# Patient Record
Sex: FEMALE | Race: WHITE | NOT HISPANIC OR LATINO | URBAN - METROPOLITAN AREA
[De-identification: names, ages, dates, MRNs, and addresses within clinical notes are randomized per-mention and may not be internally consistent; named-entity substitution may affect disease eponyms.]

---

## 2018-01-29 LAB — INR PPP: 3.1

## 2018-02-12 LAB — INR PPP: 2.2

## 2018-03-01 LAB — INR PPP: 1.9

## 2018-03-05 ENCOUNTER — ANTICOAGULATION VISIT (OUTPATIENT)
Dept: SCHEDULING | Facility: CLINIC | Age: 73
End: 2018-03-05

## 2018-03-09 LAB — INR PPP: 3.1 (ref 2–3)

## 2018-03-12 ENCOUNTER — TELEPHONE (OUTPATIENT)
Dept: CARDIOLOGY | Facility: CLINIC | Age: 73
End: 2018-03-12

## 2018-03-12 NOTE — TELEPHONE ENCOUNTER
Pt of Dr. Santiago managed by Hillary Lacey at [facility]. Call from RICKY Hernandez. Noted she sent INR to [facility] last Thursday, 3/8, but pt did not receive call with results. I advised Mary that the results to not appear to have pulled into Epic. Advised Mary that I would notify Hillary that INR was sent. Mary also sent another INR today. Noted she would like pt set up for home draws starting this Thursday 3/15. Mary can be reached at [phone] with any questions. Thank you.

## 2018-03-13 ENCOUNTER — ANTICOAGULATION VISIT (OUTPATIENT)
Dept: CARDIOLOGY | Facility: CLINIC | Age: 73
End: 2018-03-13

## 2018-03-13 LAB — INR PPP: 3.6

## 2018-03-13 RX ORDER — WARFARIN SODIUM 5 MG/1
5 TABLET ORAL DAILY
Qty: 90 TABLET | Refills: 3 | Status: SHIPPED | OUTPATIENT
Start: 2018-03-13 | End: 2019-04-05 | Stop reason: SDUPTHER

## 2018-03-13 NOTE — PROGRESS NOTES
Called [facility] after finding out that Kita had lab work done last week.  Called to Mary the visiting nurse, apparently an INR was also drawn yesterday.  I called [facility] but this was not available yet.  Will call Kita with yesterday's report.  Also set up home lab draws since the visiting nurse will be ending her sessions.

## 2018-03-14 ENCOUNTER — ANTICOAGULATION VISIT (OUTPATIENT)
Dept: CARDIOLOGY | Facility: CLINIC | Age: 73
End: 2018-03-14

## 2018-03-14 NOTE — PROGRESS NOTES
I spoke to Kita - on pain medications after shoulder replacement.  She will hold dose of coumadin today andtake 2.5 mg tomorrow.  She will have home lab draw tomorrow and I will call her on Friday.

## 2018-03-15 LAB — INR PPP: 2.7

## 2018-03-16 ENCOUNTER — TELEPHONE (OUTPATIENT)
Dept: SCHEDULING | Facility: CLINIC | Age: 73
End: 2018-03-16

## 2018-03-16 ENCOUNTER — ANTICOAGULATION VISIT (OUTPATIENT)
Dept: CARDIOLOGY | Facility: CLINIC | Age: 73
End: 2018-03-16

## 2018-03-16 NOTE — TELEPHONE ENCOUNTER
Spoke to Hillary and was told to take 1/2 pill last night and she forgot. Please call pat to advise todays dose.

## 2018-03-19 NOTE — TELEPHONE ENCOUNTER
Pts home care nurse is calling as an FYI to set the pt up to have the pts lab drawn in her home on Mondays and Thursdays.

## 2018-03-19 NOTE — TELEPHONE ENCOUNTER
Kita is set up for home draws - they will be out on Thursday.   I don't think twice a week INR's are necessary at this point.

## 2018-03-20 ENCOUNTER — ANTICOAGULATION VISIT (OUTPATIENT)
Dept: CARDIOLOGY | Facility: CLINIC | Age: 73
End: 2018-03-20

## 2018-03-20 LAB — INR PPP: 1.8 (ref 2–3)

## 2018-03-20 NOTE — PROGRESS NOTES
I spoke to Kita - she will continue current dose of coumadin and will have level rechecked later this week

## 2018-03-24 LAB
INR PPP: 2.2 (ref 0.8–1.2)
INR PPP: 2.2 (ref 2–3)
PROTHROMBIN TIME: 21.4 SEC (ref 9.1–12)

## 2018-03-26 ENCOUNTER — ANTICOAGULATION VISIT (OUTPATIENT)
Dept: CARDIOLOGY | Facility: CLINIC | Age: 73
End: 2018-03-26

## 2018-04-12 ENCOUNTER — ANTICOAGULATION VISIT (OUTPATIENT)
Dept: CARDIOLOGY | Facility: CLINIC | Age: 73
End: 2018-04-12

## 2018-04-12 LAB
INR PPP: 2.9 (ref 0.8–1.2)
PROTHROMBIN TIME: 28 SEC (ref 9.1–12)

## 2018-04-26 ENCOUNTER — TELEPHONE (OUTPATIENT)
Dept: SCHEDULING | Facility: CLINIC | Age: 73
End: 2018-04-26

## 2018-04-26 LAB
INR PPP: 2.8 (ref 0.8–1.2)
INR PPP: 2.8 (ref 2–3)
PROTHROMBIN TIME: 27.1 SEC (ref 9.1–12)

## 2018-04-26 NOTE — TELEPHONE ENCOUNTER
Dr Santiago pat. She is seeing podiatrist next 5/2 for ingrown nail. She is asking for coumadin instructions prior to procedure.

## 2018-04-27 ENCOUNTER — ANTICOAGULATION VISIT (OUTPATIENT)
Dept: CARDIOLOGY | Facility: CLINIC | Age: 73
End: 2018-04-27

## 2018-04-27 ENCOUNTER — TELEPHONE (OUTPATIENT)
Dept: SCHEDULING | Facility: CLINIC | Age: 73
End: 2018-04-27

## 2018-04-27 NOTE — TELEPHONE ENCOUNTER
Jack pt - Emmanuel pt - pt calling to ask if okay to HOLD coumadin for ingrown toenail procedure on Wed 5/2  Instructed pt to check w/Isabel SERRANO to see how long they are recommending to HOLD. Pt also calling to speak w/Hillary regarding her INR result from Wed 4/25.

## 2018-04-27 NOTE — TELEPHONE ENCOUNTER
Pt Dr Santiago  Pt called to let Coumadin Nurses know she is going to podiatrist on Wednesday and he said she can continue her coumadin as normal.  Thank you

## 2018-04-27 NOTE — PROGRESS NOTES
I spoke to Kita - she will continue current dose of coumadin.   She spoke to her podiatrist who told her that she did not need to hold her coumadin prior to treatment of ingrown toenail.

## 2018-05-10 LAB
INR PPP: 3 (ref 0.8–1.2)
Lab: NORMAL
PROTHROMBIN TIME: 28.8 SEC (ref 9.1–12)

## 2018-05-14 ENCOUNTER — ANTICOAGULATION VISIT (OUTPATIENT)
Dept: CARDIOLOGY | Facility: CLINIC | Age: 73
End: 2018-05-14

## 2018-05-23 LAB — INR PPP: 2.3 (ref 2–3)

## 2018-05-24 ENCOUNTER — ANTICOAGULATION VISIT (OUTPATIENT)
Dept: CARDIOLOGY | Facility: CLINIC | Age: 73
End: 2018-05-24

## 2018-05-24 LAB
INR PPP: 2.3 (ref 0.8–1.2)
PROTHROMBIN TIME: 22.3 SEC (ref 9.1–12)

## 2018-06-07 ENCOUNTER — ANTICOAGULATION VISIT (OUTPATIENT)
Dept: CARDIOLOGY | Facility: CLINIC | Age: 73
End: 2018-06-07

## 2018-06-07 ENCOUNTER — TELEPHONE (OUTPATIENT)
Dept: CARDIOLOGY | Facility: CLINIC | Age: 73
End: 2018-06-07

## 2018-06-07 LAB
INR PPP: 2.4 (ref 0.8–1.2)
PROTHROMBIN TIME: 23.9 SEC (ref 9.1–12)

## 2018-06-14 ENCOUNTER — OFFICE VISIT (OUTPATIENT)
Dept: CARDIOLOGY | Facility: CLINIC | Age: 73
End: 2018-06-14
Attending: INTERNAL MEDICINE
Payer: MEDICARE

## 2018-06-14 VITALS
HEART RATE: 68 BPM | SYSTOLIC BLOOD PRESSURE: 120 MMHG | DIASTOLIC BLOOD PRESSURE: 70 MMHG | RESPIRATION RATE: 12 BRPM | TEMPERATURE: 98.4 F

## 2018-06-14 DIAGNOSIS — R94.31 ABNORMAL EKG: Primary | ICD-10-CM

## 2018-06-14 DIAGNOSIS — I48.20 CHRONIC ATRIAL FIBRILLATION (CMS/HCC): ICD-10-CM

## 2018-06-14 PROBLEM — I89.0 LYMPHEDEMA: Status: ACTIVE | Noted: 2018-06-14

## 2018-06-14 PROBLEM — M17.0 OSTEOARTHRITIS OF BOTH KNEES: Status: ACTIVE | Noted: 2018-06-14

## 2018-06-14 PROBLEM — E66.3 OVERWEIGHT: Status: ACTIVE | Noted: 2018-06-14

## 2018-06-14 PROCEDURE — 93000 ELECTROCARDIOGRAM COMPLETE: CPT | Performed by: INTERNAL MEDICINE

## 2018-06-14 PROCEDURE — 99214 OFFICE O/P EST MOD 30 MIN: CPT | Performed by: INTERNAL MEDICINE

## 2018-06-14 RX ORDER — MULTIVITAMIN
1 TABLET ORAL DAILY
COMMUNITY
Start: 2016-09-07

## 2018-06-14 RX ORDER — HYDROCHLOROTHIAZIDE 25 MG/1
25 TABLET ORAL DAILY
COMMUNITY
Start: 2016-09-07

## 2018-06-14 RX ORDER — BIOTIN 1000 MCG
1 TABLET,CHEWABLE ORAL DAILY
COMMUNITY
Start: 2018-02-08

## 2018-06-14 RX ORDER — LANOLIN ALCOHOL/MO/W.PET/CERES
CREAM (GRAM) TOPICAL
COMMUNITY

## 2018-06-14 ASSESSMENT — ENCOUNTER SYMPTOMS
EYES NEGATIVE: 1
RESPIRATORY NEGATIVE: 1
NERVOUS/ANXIOUS: 1
NEUROLOGICAL NEGATIVE: 1
HEMATOLOGIC/LYMPHATIC NEGATIVE: 1
CHILLS: 1
DEPRESSION: 1
WEIGHT GAIN: 1
GASTROINTESTINAL NEGATIVE: 1

## 2018-06-14 NOTE — PROGRESS NOTES
Chief Complaint: I saw Kita in the office today on a routine visit for her chronic atrial fibrillation and postop after her shoulder replacement.  She is hemodynamically stable does not ambulate so it is difficult to assess her shortness of breath.  She denies chest discomfort or shortness of breath with anxiety, cold weather, postprandially, at rest, or nocturnally.  She denies exertional dyspnea because she does not walk very far, she denies proximal nocturnal dyspnea, orthopnea, palpitations, syncope, she has lower extremity edema secondary to chronic lymphedema and she has nocturia.       Medications:  Current Outpatient Prescriptions on File Prior to Visit   Medication Sig Dispense Refill   • warfarin (COUMADIN) 5 mg tablet Take 1 tablet (5 mg total) by mouth once daily. Take as directed 90 tablet 3     No current facility-administered medications on file prior to visit.        Review of Systems:  Review of Systems   Constitution: Positive for chills and weight gain.   HENT: Negative.    Eyes: Negative.    Cardiovascular: Positive for leg swelling.   Respiratory: Negative.    Endocrine: Positive for cold intolerance.   Hematologic/Lymphatic: Negative.    Skin: Negative.    Musculoskeletal: Positive for arthritis.   Gastrointestinal: Negative.    Genitourinary: Positive for nocturia.   Neurological: Negative.    Psychiatric/Behavioral: Positive for depression. The patient is nervous/anxious.    Allergic/Immunologic: Positive for environmental allergies.       Physical Exam:  Vitals:    06/14/18 1122   BP: 120/70   Pulse: 68   Resp: 12   Temp: 36.9 °C (98.4 °F)     Physical Exam   Constitutional: She is oriented to person, place, and time. She appears well-developed and well-nourished.   HENT:   Head: Normocephalic and atraumatic.   Eyes: Conjunctivae and EOM are normal. Pupils are equal, round, and reactive to light.   Neck: Normal range of motion. Neck supple.   Cardiovascular: Normal heart sounds and intact  distal pulses.    irregular   Pulmonary/Chest: Effort normal and breath sounds normal.   Abdominal: Soft. Bowel sounds are normal.   Musculoskeletal: Normal range of motion. She exhibits edema.   Neurological: She is alert and oriented to person, place, and time. She has normal reflexes.   Skin: Skin is warm and dry.   Psychiatric: She has a normal mood and affect. Her behavior is normal. Judgment and thought content normal.     EKG: Afib. Controlled vent. Response.  PRWP V1-4, ST-Tabn    Assessment and Plan:  Impression:  Atrial fibrillation.  Lymphedema.  Osteoarthritis.  Obesity.  Recommendation:  She is hemodynamically stable we did not have to make any changes in her medications today.  We will see her again in 6 months time.  She is going to try to do physical therapy to begin to try to ambulate again.  She has not walked since her back surgery and hip replacement.  If there is a problem we will see her sooner than 6 months.    Shahid Santiago, DO

## 2018-06-14 NOTE — LETTER
June 14, 2018     Jerome Daniel ., DO  [address]  [place] [address]    Patient: Kita Vo   YOB: 1945   Date of Visit: 6/14/2018       Dear Dr. Daniel:    Thank you for referring Kita Vo to me for evaluation. Below are my notes for this consultation.    If you have questions, please do not hesitate to call me. I look forward to following your patient along with you.         Sincerely,        Shahid Santiago DO        CC: No Recipients  Shahid Santiago DO  6/14/2018 12:07 PM  Signed    Chief Complaint: I saw Kita in the office today on a routine visit for her chronic atrial fibrillation and postop after her shoulder replacement.  She is hemodynamically stable does not ambulate so it is difficult to assess her shortness of breath.  She denies chest discomfort or shortness of breath with anxiety, cold weather, postprandially, at rest, or nocturnally.  She denies exertional dyspnea because she does not walk very far, she denies proximal nocturnal dyspnea, orthopnea, palpitations, syncope, she has lower extremity edema secondary to chronic lymphedema and she has nocturia.       Medications:  Current Outpatient Prescriptions on File Prior to Visit   Medication Sig Dispense Refill   • warfarin (COUMADIN) 5 mg tablet Take 1 tablet (5 mg total) by mouth once daily. Take as directed 90 tablet 3     No current facility-administered medications on file prior to visit.        Review of Systems:  Review of Systems   Constitution: Positive for chills and weight gain.   HENT: Negative.    Eyes: Negative.    Cardiovascular: Positive for leg swelling.   Respiratory: Negative.    Endocrine: Positive for cold intolerance.   Hematologic/Lymphatic: Negative.    Skin: Negative.    Musculoskeletal: Positive for arthritis.   Gastrointestinal: Negative.    Genitourinary: Positive for nocturia.   Neurological: Negative.    Psychiatric/Behavioral: Positive for depression. The patient is nervous/anxious.     Allergic/Immunologic: Positive for environmental allergies.       Physical Exam:  Vitals:    06/14/18 1122   BP: 120/70   Pulse: 68   Resp: 12   Temp: 36.9 °C (98.4 °F)     Physical Exam   Constitutional: She is oriented to person, place, and time. She appears well-developed and well-nourished.   HENT:   Head: Normocephalic and atraumatic.   Eyes: Conjunctivae and EOM are normal. Pupils are equal, round, and reactive to light.   Neck: Normal range of motion. Neck supple.   Cardiovascular: Normal heart sounds and intact distal pulses.    irregular   Pulmonary/Chest: Effort normal and breath sounds normal.   Abdominal: Soft. Bowel sounds are normal.   Musculoskeletal: Normal range of motion. She exhibits edema.   Neurological: She is alert and oriented to person, place, and time. She has normal reflexes.   Skin: Skin is warm and dry.   Psychiatric: She has a normal mood and affect. Her behavior is normal. Judgment and thought content normal.     EKG: Afib. Controlled vent. Response.  PRWP V1-4, ST-Tabn    Assessment and Plan:  Impression:  Atrial fibrillation.  Lymphedema.  Osteoarthritis.  Obesity.  Recommendation:  She is hemodynamically stable we did not have to make any changes in her medications today.  We will see her again in 6 months time.  She is going to try to do physical therapy to begin to try to ambulate again.  She has not walked since her back surgery and hip replacement.  If there is a problem we will see her sooner than 6 months.    Shahid Santiago, DO

## 2018-06-20 LAB
INR PPP: 2.8
INR PPP: 2.8

## 2018-06-21 LAB
INR PPP: 2.8 (ref 0.8–1.2)
PROTHROMBIN TIME: 26.8 SEC (ref 9.1–12)

## 2018-06-22 ENCOUNTER — ANTICOAGULATION VISIT (OUTPATIENT)
Dept: CARDIOLOGY | Facility: CLINIC | Age: 73
End: 2018-06-22

## 2018-06-22 NOTE — PATIENT INSTRUCTIONS
I called and left a voicemail for patient letting her know her INR from 06/20/18 was 2.8 which is within range and to continue current Coumadin regimen. I let her know I placed a PT/INR order for 07/03/18 before the holiday. I told her to call the office with any questions.

## 2018-07-05 ENCOUNTER — TELEPHONE (OUTPATIENT)
Dept: CARDIOLOGY | Facility: CLINIC | Age: 73
End: 2018-07-05

## 2018-07-05 NOTE — TELEPHONE ENCOUNTER
Coumadin pt of Hillary Lacey.  Pt was sched for home draw on 7/4..  Of course due to holiday this was not done.  She would like a call back.

## 2018-07-05 NOTE — TELEPHONE ENCOUNTER
Returned a call to Kita - she has a doctors appt. Early tomorrow morning so I changed her lab draw to next Wednesday.  I also changed the frequency to q 3 weeks.

## 2018-07-12 ENCOUNTER — ANTICOAGULATION VISIT (OUTPATIENT)
Dept: CARDIOLOGY | Facility: CLINIC | Age: 73
End: 2018-07-12

## 2018-07-12 LAB
INR PPP: 1.6 (ref 0.8–1.2)
PROTHROMBIN TIME: 16.5 SEC (ref 9.1–12)

## 2018-07-12 NOTE — PROGRESS NOTES
Kita believes that she missed a dose of coumadin last week.  She will continue regular schedule and we will recheck in 2 weeks.  She is generally therapeutic.

## 2018-08-23 ENCOUNTER — ANTICOAGULATION VISIT (OUTPATIENT)
Dept: CARDIOLOGY | Facility: CLINIC | Age: 73
End: 2018-08-23

## 2018-08-23 LAB
INR PPP: 2.7
INR PPP: 2.7 (ref 0.8–1.2)
PROTHROMBIN TIME: 26.8 SEC (ref 9.1–12)

## 2018-09-13 ENCOUNTER — ANTICOAGULATION VISIT (OUTPATIENT)
Dept: CARDIOLOGY | Facility: CLINIC | Age: 73
End: 2018-09-13

## 2018-09-13 LAB
INR PPP: 2.2 (ref 0.8–1.2)
PROTHROMBIN TIME: 22.5 SEC (ref 9.1–12)

## 2018-10-04 ENCOUNTER — ANTICOAGULATION VISIT (OUTPATIENT)
Dept: CARDIOLOGY | Facility: CLINIC | Age: 73
End: 2018-10-04

## 2018-10-04 LAB
INR PPP: 2.4 (ref 0.8–1.2)
PROTHROMBIN TIME: 24.4 SEC (ref 9.1–12)

## 2018-10-25 ENCOUNTER — ANTICOAGULATION VISIT (OUTPATIENT)
Dept: CARDIOLOGY | Facility: CLINIC | Age: 73
End: 2018-10-25

## 2018-10-25 LAB
INR PPP: 2.2 (ref 0.8–1.2)
PROTHROMBIN TIME: 22 SEC (ref 9.1–12)

## 2018-10-25 NOTE — PROGRESS NOTES
I spoke to Kita - she will continue current dose of coumadin.  She is requesting monthly lab draws ( instead of q3wks).  She has been pretty consistently therapeutic, will change order.

## 2018-11-22 LAB
INR PPP: 2.4 (ref 0.8–1.2)
PROTHROMBIN TIME: 23.6 SEC (ref 9.1–12)

## 2018-11-26 ENCOUNTER — ANTICOAGULATION VISIT (OUTPATIENT)
Dept: CARDIOLOGY | Facility: CLINIC | Age: 73
End: 2018-11-26

## 2018-12-13 ENCOUNTER — OFFICE VISIT (OUTPATIENT)
Dept: CARDIOLOGY | Facility: CLINIC | Age: 73
End: 2018-12-13
Payer: MEDICARE

## 2018-12-13 VITALS
TEMPERATURE: 98.6 F | RESPIRATION RATE: 14 BRPM | DIASTOLIC BLOOD PRESSURE: 86 MMHG | HEART RATE: 80 BPM | SYSTOLIC BLOOD PRESSURE: 134 MMHG

## 2018-12-13 DIAGNOSIS — I48.20 CHRONIC ATRIAL FIBRILLATION (CMS/HCC): Primary | ICD-10-CM

## 2018-12-13 PROBLEM — M19.019 SHOULDER ARTHRITIS: Status: ACTIVE | Noted: 2018-02-14

## 2018-12-13 PROCEDURE — 99214 OFFICE O/P EST MOD 30 MIN: CPT | Performed by: INTERNAL MEDICINE

## 2018-12-13 PROCEDURE — 93000 ELECTROCARDIOGRAM COMPLETE: CPT | Performed by: INTERNAL MEDICINE

## 2018-12-13 RX ORDER — GUAIFENESIN 600 MG/1
600 TABLET, EXTENDED RELEASE ORAL 2 TIMES DAILY
COMMUNITY
End: 2022-02-24 | Stop reason: ALTCHOICE

## 2018-12-13 RX ORDER — FLUTICASONE PROPIONATE 50 MCG
2 SPRAY, SUSPENSION (ML) NASAL DAILY PRN
COMMUNITY
Start: 2018-12-12 | End: 2021-02-25

## 2018-12-13 ASSESSMENT — ENCOUNTER SYMPTOMS
HEMATOLOGIC/LYMPHATIC NEGATIVE: 1
EYES NEGATIVE: 1
GASTROINTESTINAL NEGATIVE: 1
RESPIRATORY NEGATIVE: 1
PSYCHIATRIC NEGATIVE: 1
PARESTHESIAS: 1
ENDOCRINE NEGATIVE: 1
CONSTITUTIONAL NEGATIVE: 1

## 2018-12-13 NOTE — LETTER
December 13, 2018     Jerome Daniel Sr., DO  [address]  [place] [address]    Patient: Kita Vo   YOB: 1945   Date of Visit: 12/13/2018       Dear Dr. Daniel:    Thank you for referring Kita Vo to me for evaluation. Below are my notes for this consultation.    If you have questions, please do not hesitate to call me. I look forward to following your patient along with you.         Sincerely,        Shahid Santiago DO        CC: No Recipients  Shahid Santiago DO  12/13/2018 10:44 AM  Signed      Chief Complaint: I saw Ms. Sheikh in the office today on a routine visit for her anticoagulation of her chronic atrial fibrillation.  She does not walk to is hard to assess her true functional capacity.  She denies chest discomfort or shortness of breath with anxiety cold weather postprandially at rest or nocturnally.  She does not climb stairs.  She denies proximal nocturnal dyspnea orthopnea palpitations syncope she has bilateral lymphedema and she has nocturia.       Medications:  Current Outpatient Prescriptions   Medication Sig Dispense Refill   • calcium carbonate-vitamin D3 (CALCIUM WITH VITAMIN D) 600 mg(1,500mg) -400 unit per tablet Take 1 tablet by mouth daily.     • cyanocobalamin (vitamin B-12) 1,000 mcg tablet take 1 tablet by oral route  every day     • fluticasone (FLONASE) 50 mcg/actuation nasal spray Administer 2 sprays into each nostril daily as needed.     • guaiFENesin (MUCINEX) 600 mg 12 hr tablet Take 600 mg by mouth 2 (two) times a day.     • hydrochlorothiazide (HYDRODIURIL) 25 mg tablet Take 25 mg by mouth daily.     • multivit-iron-min-folic acid (multivitamin-iron-minerals-folic acid) 3,500-18-0.4 unit-mg-mg tablet,chewable Take 1 tablet by mouth daily.     • warfarin (COUMADIN) 5 mg tablet Take 1 tablet (5 mg total) by mouth once daily. Take as directed 90 tablet 3   • biotin 1,000 mcg tablet,chewable Take 1 tablet by mouth daily.       No current facility-administered  medications for this visit.        Review of Systems:  Review of Systems   Constitution: Negative.   HENT: Positive for congestion.    Eyes: Negative.    Cardiovascular: Positive for leg swelling.        Lymphedema   Respiratory: Negative.    Endocrine: Negative.    Hematologic/Lymphatic: Negative.    Skin: Negative.    Musculoskeletal: Positive for joint pain.   Gastrointestinal: Negative.    Genitourinary: Positive for nocturia.   Neurological: Positive for paresthesias.   Psychiatric/Behavioral: Negative.    Allergic/Immunologic: Positive for environmental allergies.       Physical Exam:  Vitals:    12/13/18 1035   BP: 134/86   Pulse: 80   Resp: 14   Temp: 37 °C (98.6 °F)     Physical Exam   Constitutional: She is oriented to person, place, and time. She appears well-developed and well-nourished.   obese   HENT:   Head: Normocephalic and atraumatic.   Eyes: Conjunctivae and EOM are normal. Pupils are equal, round, and reactive to light.   Neck: Normal range of motion. Neck supple.   Cardiovascular: Normal rate, regular rhythm, normal heart sounds and intact distal pulses.    Pulmonary/Chest: Effort normal and breath sounds normal.   Abdominal: Soft. Bowel sounds are normal.   Musculoskeletal: Normal range of motion. She exhibits edema.   Neurological: She is alert and oriented to person, place, and time. She has normal strength and normal reflexes.   Skin: Skin is warm, dry and intact.   Psychiatric: She has a normal mood and affect. Her speech is normal and behavior is normal. Judgment and thought content normal. Cognition and memory are normal.     EKG: AFIB controlled ventricular response Leftward axis ST-Tabn    Assessment and Plan:  Impression:  A. fib controlled ventricular response on warfarin.  Lymphedema mild systolic hypertension.  Obesity.  Recommendation:  She is to continue on her anticoagulation and her self-directed care of her lymphedema.  We will see her in 6 months time, if there is a problem we  will see her sooner.  We did discuss diet and exercise with her it is fruitless at this point.  Thank you very much for allowing us see this very interesting woman.    Shahid Santiago, DO

## 2018-12-13 NOTE — PROGRESS NOTES
Chief Complaint: I saw Ms. Sheikh in the office today on a routine visit for her anticoagulation of her chronic atrial fibrillation.  She does not walk to is hard to assess her true functional capacity.  She denies chest discomfort or shortness of breath with anxiety cold weather postprandially at rest or nocturnally.  She does not climb stairs.  She denies proximal nocturnal dyspnea orthopnea palpitations syncope she has bilateral lymphedema and she has nocturia.       Medications:  Current Outpatient Prescriptions   Medication Sig Dispense Refill   • calcium carbonate-vitamin D3 (CALCIUM WITH VITAMIN D) 600 mg(1,500mg) -400 unit per tablet Take 1 tablet by mouth daily.     • cyanocobalamin (vitamin B-12) 1,000 mcg tablet take 1 tablet by oral route  every day     • fluticasone (FLONASE) 50 mcg/actuation nasal spray Administer 2 sprays into each nostril daily as needed.     • guaiFENesin (MUCINEX) 600 mg 12 hr tablet Take 600 mg by mouth 2 (two) times a day.     • hydrochlorothiazide (HYDRODIURIL) 25 mg tablet Take 25 mg by mouth daily.     • multivit-iron-min-folic acid (multivitamin-iron-minerals-folic acid) 3,500-18-0.4 unit-mg-mg tablet,chewable Take 1 tablet by mouth daily.     • warfarin (COUMADIN) 5 mg tablet Take 1 tablet (5 mg total) by mouth once daily. Take as directed 90 tablet 3   • biotin 1,000 mcg tablet,chewable Take 1 tablet by mouth daily.       No current facility-administered medications for this visit.        Review of Systems:  Review of Systems   Constitution: Negative.   HENT: Positive for congestion.    Eyes: Negative.    Cardiovascular: Positive for leg swelling.        Lymphedema   Respiratory: Negative.    Endocrine: Negative.    Hematologic/Lymphatic: Negative.    Skin: Negative.    Musculoskeletal: Positive for joint pain.   Gastrointestinal: Negative.    Genitourinary: Positive for nocturia.   Neurological: Positive for paresthesias.   Psychiatric/Behavioral: Negative.     Allergic/Immunologic: Positive for environmental allergies.       Physical Exam:  Vitals:    12/13/18 1035   BP: 134/86   Pulse: 80   Resp: 14   Temp: 37 °C (98.6 °F)     Physical Exam   Constitutional: She is oriented to person, place, and time. She appears well-developed and well-nourished.   obese   HENT:   Head: Normocephalic and atraumatic.   Eyes: Conjunctivae and EOM are normal. Pupils are equal, round, and reactive to light.   Neck: Normal range of motion. Neck supple.   Cardiovascular: Normal rate, regular rhythm, normal heart sounds and intact distal pulses.    Pulmonary/Chest: Effort normal and breath sounds normal.   Abdominal: Soft. Bowel sounds are normal.   Musculoskeletal: Normal range of motion. She exhibits edema.   Neurological: She is alert and oriented to person, place, and time. She has normal strength and normal reflexes.   Skin: Skin is warm, dry and intact.   Psychiatric: She has a normal mood and affect. Her speech is normal and behavior is normal. Judgment and thought content normal. Cognition and memory are normal.     EKG: AFIB controlled ventricular response Leftward axis ST-Tabn    Assessment and Plan:  Impression:  A. fib controlled ventricular response on warfarin.  Lymphedema mild systolic hypertension.  Obesity.  Recommendation:  She is to continue on her anticoagulation and her self-directed care of her lymphedema.  We will see her in 6 months time, if there is a problem we will see her sooner.  We did discuss diet and exercise with her it is fruitless at this point.  Thank you very much for allowing us see this very interesting woman.    Shahid Santiago,

## 2018-12-20 ENCOUNTER — ANTICOAGULATION VISIT (OUTPATIENT)
Dept: CARDIOLOGY | Facility: CLINIC | Age: 73
End: 2018-12-20

## 2018-12-20 LAB
INR PPP: 2 (ref 0.8–1.2)
PROTHROMBIN TIME: 19.8 SEC (ref 9.1–12)

## 2019-01-17 ENCOUNTER — ANTICOAGULATION VISIT (OUTPATIENT)
Dept: CARDIOLOGY | Facility: CLINIC | Age: 74
End: 2019-01-17

## 2019-01-17 LAB
INR PPP: 2.1 (ref 0.8–1.2)
PROTHROMBIN TIME: 21.4 SEC (ref 9.1–12)

## 2019-02-14 ENCOUNTER — ANTICOAGULATION VISIT (OUTPATIENT)
Dept: CARDIOLOGY | Facility: CLINIC | Age: 74
End: 2019-02-14

## 2019-02-14 LAB
INR PPP: 2.8 (ref 0.8–1.2)
PROTHROMBIN TIME: 28 SEC (ref 9.1–12)

## 2019-02-14 NOTE — PROGRESS NOTES
I called Mrs. Rubio and GIULIA that her INR was within range and to continue her current dose of coumadin.

## 2019-03-13 LAB — INR PPP: 2 (ref 2–3)

## 2019-03-14 ENCOUNTER — ANTICOAGULATION VISIT (OUTPATIENT)
Dept: CARDIOLOGY | Facility: CLINIC | Age: 74
End: 2019-03-14

## 2019-03-14 LAB
INR PPP: 2 (ref 0.8–1.2)
PROTHROMBIN TIME: 20.4 SEC (ref 9.1–12)

## 2019-04-05 RX ORDER — WARFARIN SODIUM 5 MG/1
5 TABLET ORAL DAILY
Qty: 90 TABLET | Refills: 3 | Status: SHIPPED | OUTPATIENT
Start: 2019-04-05 | End: 2020-04-29 | Stop reason: SDUPTHER

## 2019-04-11 ENCOUNTER — ANTICOAGULATION VISIT (OUTPATIENT)
Dept: CARDIOLOGY | Facility: CLINIC | Age: 74
End: 2019-04-11

## 2019-04-11 LAB
INR PPP: 1.9 (ref 0.8–1.2)
PROTHROMBIN TIME: 19.3 SEC (ref 9.1–12)

## 2019-05-08 LAB — INR PPP: 2.1 (ref 2–3)

## 2019-05-09 ENCOUNTER — ANTICOAGULATION VISIT (OUTPATIENT)
Dept: CARDIOLOGY | Facility: CLINIC | Age: 74
End: 2019-05-09

## 2019-05-09 LAB
INR PPP: 2.1 (ref 0.8–1.2)
PROTHROMBIN TIME: 20.9 SEC (ref 9.1–12)

## 2019-05-23 ENCOUNTER — TELEPHONE (OUTPATIENT)
Dept: SCHEDULING | Facility: CLINIC | Age: 74
End: 2019-05-23

## 2019-05-23 NOTE — TELEPHONE ENCOUNTER
I returned a call to Kita - she hasn't even scheduled an appointment with the dentist yet so not sure what they need to do.  I told her that she does not need to hold for most things.  If having an extraction, she can hold for 2 days.  She has an appt. In 2 weeks to see Dr. Santiago.

## 2019-05-23 NOTE — TELEPHONE ENCOUNTER
Pt is unscheduled for a dental procedure and would like to know if and or can she HOLD Warfarin and if so, how many days prior to the dental procedure.      Please contact pt at

## 2019-05-23 NOTE — TELEPHONE ENCOUNTER
Chart reviewed. Patient follows with Dr. Santiago in the [facility] office with Coumadin management there. She is on Coumadin for a history of persistent atrial fibrillation. Routing to you to address, thank you.

## 2019-06-06 ENCOUNTER — ANTICOAGULATION VISIT (OUTPATIENT)
Dept: CARDIOLOGY | Facility: CLINIC | Age: 74
End: 2019-06-06

## 2019-06-06 LAB
INR PPP: 1.8 (ref 0.8–1.2)
PROTHROMBIN TIME: 17.9 SEC (ref 9.1–12)

## 2019-06-06 NOTE — PROGRESS NOTES
I called Kita and GIULIA for her to continue her current dose of coumadin - generally therapeutic.

## 2019-06-13 ENCOUNTER — OFFICE VISIT (OUTPATIENT)
Dept: CARDIOLOGY | Facility: CLINIC | Age: 74
End: 2019-06-13
Payer: MEDICARE

## 2019-06-13 VITALS — HEART RATE: 71 BPM | RESPIRATION RATE: 16 BRPM | DIASTOLIC BLOOD PRESSURE: 70 MMHG | SYSTOLIC BLOOD PRESSURE: 110 MMHG

## 2019-06-13 DIAGNOSIS — I48.20 CHRONIC ATRIAL FIBRILLATION (CMS/HCC): Primary | ICD-10-CM

## 2019-06-13 PROBLEM — K46.9 ENTEROCELE: Status: ACTIVE | Noted: 2019-06-13

## 2019-06-13 PROCEDURE — 99214 OFFICE O/P EST MOD 30 MIN: CPT | Performed by: INTERNAL MEDICINE

## 2019-06-13 PROCEDURE — 93000 ELECTROCARDIOGRAM COMPLETE: CPT | Performed by: INTERNAL MEDICINE

## 2019-06-13 ASSESSMENT — ENCOUNTER SYMPTOMS
PSYCHIATRIC NEGATIVE: 1
BRUISES/BLEEDS EASILY: 1
EYES NEGATIVE: 1
CONSTITUTIONAL NEGATIVE: 1
PARESTHESIAS: 1
GASTROINTESTINAL NEGATIVE: 1
RESPIRATORY NEGATIVE: 1
ENDOCRINE NEGATIVE: 1

## 2019-06-13 NOTE — PROGRESS NOTES
Chief Complaint: I saw Ms. Sheikh in the office today on a routine visit for her atrial fibrillation and hypertension.  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  She denies any form of chest discomfort or shortness of breath with anxiety, cold weather, postprandially, at rest, or nocturnally.  She does not climb stairs, she denies proximal nocturnal dyspnea, orthopnea, palpitations, syncope she has lymphedema both legs does not want to be a today there is no cellulitis and she has nocturia.       Medications:  Current Outpatient Prescriptions   Medication Sig Dispense Refill   • biotin 1,000 mcg tablet,chewable Take 1 tablet by mouth daily.     • calcium carbonate-vitamin D3 (CALCIUM WITH VITAMIN D) 600 mg(1,500mg) -400 unit per tablet Take 1 tablet by mouth daily.     • cyanocobalamin (vitamin B-12) 1,000 mcg tablet take 1 tablet by oral route  every day     • fluticasone (FLONASE) 50 mcg/actuation nasal spray Administer 2 sprays into each nostril daily as needed.     • hydrochlorothiazide (HYDRODIURIL) 25 mg tablet Take 25 mg by mouth daily.     • multivit-iron-min-folic acid (multivitamin-iron-minerals-folic acid) 3,500-18-0.4 unit-mg-mg tablet,chewable Take 1 tablet by mouth daily.     • warfarin (COUMADIN) 5 mg tablet Take 1 tablet (5 mg total) by mouth once daily. Take as directed 90 tablet 3   • guaiFENesin (MUCINEX) 600 mg 12 hr tablet Take 600 mg by mouth 2 (two) times a day.       No current facility-administered medications for this visit.        Review of Systems:  Review of Systems   Constitution: Negative.   HENT: Negative.    Eyes: Negative.    Cardiovascular: Positive for leg swelling.   Respiratory: Negative.    Endocrine: Negative.    Hematologic/Lymphatic: Bruises/bleeds easily.   Skin: Negative.    Musculoskeletal: Positive for arthritis.   Gastrointestinal: Negative.    Genitourinary: Positive for nocturia.    Neurological: Positive for paresthesias.   Psychiatric/Behavioral: Negative.    Allergic/Immunologic: Positive for environmental allergies.       Physical Exam:  Vitals:    06/13/19 1015   BP: 110/70   Pulse: 71   Resp: 16     Physical Exam   Constitutional: She is oriented to person, place, and time.   obesity   HENT:   Head: Normocephalic and atraumatic.   Eyes: Pupils are equal, round, and reactive to light. Conjunctivae and EOM are normal.   Neck: Normal range of motion. Neck supple.   Cardiovascular:   afib controlled ventricular   Pulmonary/Chest: Effort normal and breath sounds normal.   Abdominal: Soft. Bowel sounds are normal.   Musculoskeletal: She exhibits edema.   Neurological: She is alert and oriented to person, place, and time. She has normal reflexes.   Skin: Skin is warm and dry.   Psychiatric: She has a normal mood and affect. Her behavior is normal. Judgment and thought content normal.     EKG: afib controlled ventricluar response, ST-Tabn    Assessment and Plan:  Impression:  Atrial fibrillation controlled ventricular response.  Lymphedema.  Abnormal EKG.  Overweight.  Recommendation:  She is hemodynamically stable I did not have to make any changes in her medications today we did  her on diet and salt restriction and fluid restriction and support stockings.  We will see her again in 6 months time, if there is a problem we will see her sooner.  Thank you for the opportunity seeing Mrs. Isbell in the office today.    Shahid Santiago,

## 2019-06-13 NOTE — LETTER
June 13, 2019     Jerome Daniel Sr., DO  [address]  [place] [address]    Patient: Kita Vo  YOB: 1945  Date of Visit: 6/13/2019      Dear Dr. Daniel:    Thank you for referring Kita Vo to me for evaluation. Below are my notes for this consultation.    If you have questions, please do not hesitate to call me. I look forward to following your patient along with you.         Sincerely,        Shahid Santiago DO        CC: No Recipients  Shahid Santiago DO  6/13/2019 10:44 AM  Signed      Chief Complaint: I saw Ms. Sheikh in the office today on a routine visit for her atrial fibrillation and hypertension.  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  She denies any form of chest discomfort or shortness of breath with anxiety, cold weather, postprandially, at rest, or nocturnally.  She does not climb stairs, she denies proximal nocturnal dyspnea, orthopnea, palpitations, syncope she has lymphedema both legs does not want to be a today there is no cellulitis and she has nocturia.       Medications:  Current Outpatient Prescriptions   Medication Sig Dispense Refill   • biotin 1,000 mcg tablet,chewable Take 1 tablet by mouth daily.     • calcium carbonate-vitamin D3 (CALCIUM WITH VITAMIN D) 600 mg(1,500mg) -400 unit per tablet Take 1 tablet by mouth daily.     • cyanocobalamin (vitamin B-12) 1,000 mcg tablet take 1 tablet by oral route  every day     • fluticasone (FLONASE) 50 mcg/actuation nasal spray Administer 2 sprays into each nostril daily as needed.     • hydrochlorothiazide (HYDRODIURIL) 25 mg tablet Take 25 mg by mouth daily.     • multivit-iron-min-folic acid (multivitamin-iron-minerals-folic acid) 3,500-18-0.4 unit-mg-mg tablet,chewable Take 1 tablet by mouth daily.     • warfarin (COUMADIN) 5 mg tablet Take 1 tablet (5 mg total) by mouth once daily. Take as directed 90 tablet 3   • guaiFENesin (MUCINEX)  600 mg 12 hr tablet Take 600 mg by mouth 2 (two) times a day.       No current facility-administered medications for this visit.        Review of Systems:  Review of Systems   Constitution: Negative.   HENT: Negative.    Eyes: Negative.    Cardiovascular: Positive for leg swelling.   Respiratory: Negative.    Endocrine: Negative.    Hematologic/Lymphatic: Bruises/bleeds easily.   Skin: Negative.    Musculoskeletal: Positive for arthritis.   Gastrointestinal: Negative.    Genitourinary: Positive for nocturia.   Neurological: Positive for paresthesias.   Psychiatric/Behavioral: Negative.    Allergic/Immunologic: Positive for environmental allergies.       Physical Exam:  Vitals:    06/13/19 1015   BP: 110/70   Pulse: 71   Resp: 16     Physical Exam   Constitutional: She is oriented to person, place, and time.   obesity   HENT:   Head: Normocephalic and atraumatic.   Eyes: Pupils are equal, round, and reactive to light. Conjunctivae and EOM are normal.   Neck: Normal range of motion. Neck supple.   Cardiovascular:   afib controlled ventricular   Pulmonary/Chest: Effort normal and breath sounds normal.   Abdominal: Soft. Bowel sounds are normal.   Musculoskeletal: She exhibits edema.   Neurological: She is alert and oriented to person, place, and time. She has normal reflexes.   Skin: Skin is warm and dry.   Psychiatric: She has a normal mood and affect. Her behavior is normal. Judgment and thought content normal.     EKG: afib controlled ventricluar response, ST-Tabn    Assessment and Plan:  Impression:  Atrial fibrillation controlled ventricular response.  Lymphedema.  Abnormal EKG.  Overweight.  Recommendation:  She is hemodynamically stable I did not have to make any changes in her medications today we did  her on diet and salt restriction and fluid restriction and support stockings.  We will see her again in 6 months time, if there is a problem we will see her sooner.  Thank you for the opportunity seeing  Mrs. Isbell in the office today.    Shahid Santiago, DO

## 2019-07-04 LAB
INR PPP: 1.9 (ref 0.8–1.2)
PROTHROMBIN TIME: 18.8 SEC (ref 9.1–12)

## 2019-07-05 ENCOUNTER — ANTICOAGULATION VISIT (OUTPATIENT)
Dept: CARDIOLOGY | Facility: CLINIC | Age: 74
End: 2019-07-05

## 2019-08-01 ENCOUNTER — ANTICOAGULATION VISIT (OUTPATIENT)
Dept: CARDIOLOGY | Facility: CLINIC | Age: 74
End: 2019-08-01

## 2019-08-01 LAB
INR PPP: 2.4 (ref 0.8–1.2)
PROTHROMBIN TIME: 23.6 SEC (ref 9.1–12)

## 2019-08-29 ENCOUNTER — ANTICOAGULATION VISIT (OUTPATIENT)
Dept: CARDIOLOGY | Facility: CLINIC | Age: 74
End: 2019-08-29

## 2019-08-29 LAB
INR PPP: 2.3 (ref 0.8–1.2)
PROTHROMBIN TIME: 22.3 SEC (ref 9.1–12)

## 2019-09-26 ENCOUNTER — ANTICOAGULATION VISIT (OUTPATIENT)
Dept: CARDIOLOGY | Facility: CLINIC | Age: 74
End: 2019-09-26

## 2019-09-26 LAB
INR PPP: 2.2 (ref 0.8–1.2)
PROTHROMBIN TIME: 21.6 SEC (ref 9.1–12)

## 2019-09-26 NOTE — PROGRESS NOTES
I called and LM for Kita that her INR was within range and to continue her current dose of coumadin.  Asked her to call me back with any questions or change in medications.

## 2019-10-24 ENCOUNTER — ANTICOAGULATION VISIT (OUTPATIENT)
Dept: CARDIOLOGY | Facility: CLINIC | Age: 74
End: 2019-10-24

## 2019-10-24 LAB
INR PPP: 2.2
INR PPP: 2.2 (ref 0.8–1.2)
PROTHROMBIN TIME: 21.4 SEC (ref 9.1–12)

## 2019-11-18 ENCOUNTER — TELEPHONE (OUTPATIENT)
Dept: SCHEDULING | Facility: CLINIC | Age: 74
End: 2019-11-18

## 2019-11-18 NOTE — TELEPHONE ENCOUNTER
Dr. Santiago patient.    Patient calling to let us know she needs a new 6 month INR lab slip to continue with her home INR lab draws through [facility]. Patient told me that Christina ( [facility] representative) faxed a request to you on Friday. If you need to speak with Christina directly she can be reached at [phone].    Once above has been taken care of, patient would like a return call at [phone] to let her know a new lab slip has been generated. Thank you.

## 2019-11-18 NOTE — TELEPHONE ENCOUNTER
I contacted [facility] to confirm that they rec'd the order that I faxed last week.  They have it and Kita is scheduled for this Thursday.  I called her back to let her know and told her that I would call her on Friday with her results.

## 2019-11-28 LAB
INR PPP: 2.5
INR PPP: 2.5 (ref 0.8–1.2)
PROTHROMBIN TIME: 23.6 SEC (ref 9.1–12)

## 2019-11-29 ENCOUNTER — ANTICOAGULATION VISIT (OUTPATIENT)
Dept: CARDIOLOGY | Facility: CLINIC | Age: 74
End: 2019-11-29

## 2019-12-12 ENCOUNTER — OFFICE VISIT (OUTPATIENT)
Dept: CARDIOLOGY | Facility: CLINIC | Age: 74
End: 2019-12-12
Payer: MEDICARE

## 2019-12-12 VITALS
SYSTOLIC BLOOD PRESSURE: 124 MMHG | BODY MASS INDEX: 40.92 KG/M2 | TEMPERATURE: 98.6 F | HEART RATE: 71 BPM | HEIGHT: 68 IN | RESPIRATION RATE: 16 BRPM | WEIGHT: 270 LBS | DIASTOLIC BLOOD PRESSURE: 76 MMHG

## 2019-12-12 DIAGNOSIS — I48.91 ATRIAL FIBRILLATION, UNSPECIFIED TYPE (CMS/HCC): Primary | ICD-10-CM

## 2019-12-12 PROCEDURE — 99214 OFFICE O/P EST MOD 30 MIN: CPT | Performed by: INTERNAL MEDICINE

## 2019-12-12 PROCEDURE — 93000 ELECTROCARDIOGRAM COMPLETE: CPT | Performed by: INTERNAL MEDICINE

## 2019-12-12 ASSESSMENT — ENCOUNTER SYMPTOMS
DEPRESSION: 1
CONSTITUTIONAL NEGATIVE: 1
EYES NEGATIVE: 1
HEMATOLOGIC/LYMPHATIC NEGATIVE: 1
ENDOCRINE NEGATIVE: 1
NEUROLOGICAL NEGATIVE: 1
RESPIRATORY NEGATIVE: 1
IRREGULAR HEARTBEAT: 1

## 2019-12-12 NOTE — LETTER
December 12, 2019     Jerome Daniel Sr., DO  [address]  [place] [address]    Patient: Kita Vo  YOB: 1945  Date of Visit: 12/12/2019      Dear Dr. Daniel:    Thank you for referring Kita Vo to me for evaluation. Below are my notes for this consultation.    If you have questions, please do not hesitate to call me. I look forward to following your patient along with you.         Sincerely,        Shahid Santiago DO        CC: No Recipients  Shahid Santiago DO  12/12/2019 11:50 AM  Signed      Chief Complaint: I saw Kita in the office today on a routine visit for her hypertension, atrial fibrillation, and lymphedema of her lower extremities.  She is hemodynamically stable does not ambulate due to her severe arthritis, denies chest discomfort or shortness of breath with anxiety, cold weather, postprandially, at rest or nocturnally.  She sleeps upright due to musculoskeletal dysfunction denies proximal nocturnal dyspnea orthopnea palpitations syncope and has chronic lymphedema and nocturia.       Medications:  Current Outpatient Medications   Medication Sig Dispense Refill   • biotin 1,000 mcg tablet,chewable Take 1 tablet by mouth daily.     • calcium carbonate-vitamin D3 (CALCIUM WITH VITAMIN D) 600 mg(1,500mg) -400 unit per tablet Take 1 tablet by mouth daily.     • cyanocobalamin (vitamin B-12) 1,000 mcg tablet take 1 tablet by oral route  every day     • hydrochlorothiazide (HYDRODIURIL) 25 mg tablet Take 25 mg by mouth daily.     • multivit-iron-min-folic acid (multivitamin-iron-minerals-folic acid) 3,500-18-0.4 unit-mg-mg tablet,chewable Take 1 tablet by mouth daily.     • warfarin (COUMADIN) 5 mg tablet Take 1 tablet (5 mg total) by mouth once daily. Take as directed 90 tablet 3   • fluticasone (FLONASE) 50 mcg/actuation nasal spray Administer 2 sprays into each nostril daily as needed.     • guaiFENesin (MUCINEX) 600 mg 12 hr tablet Take 600 mg by mouth 2 (two) times a day.        No current facility-administered medications for this visit.        Review of Systems:  Review of Systems   Constitution: Negative.   HENT: Negative.    Eyes: Negative.    Cardiovascular: Positive for irregular heartbeat and leg swelling.   Respiratory: Negative.    Endocrine: Negative.    Hematologic/Lymphatic: Negative.    Skin: Negative.    Musculoskeletal: Positive for joint pain.   Genitourinary: Positive for nocturia.   Neurological: Negative.    Psychiatric/Behavioral: Positive for depression.   Allergic/Immunologic: Positive for environmental allergies.       Physical Exam:  Vitals:    12/12/19 1118   BP: 124/76   Pulse: 71   Resp: 16   Temp: 37 °C (98.6 °F)     Physical Exam   Constitutional: She is oriented to person, place, and time.   overweight   HENT:   Head: Normocephalic and atraumatic.   Eyes: Pupils are equal, round, and reactive to light. Conjunctivae and EOM are normal.   Neck: Normal range of motion. Neck supple.   Cardiovascular:   afib   Pulmonary/Chest: Effort normal and breath sounds normal.   Abdominal: Soft. Bowel sounds are normal.   Musculoskeletal: She exhibits edema.   Neurological: She is alert and oriented to person, place, and time. She has normal reflexes.   Skin: Skin is warm and dry.   Psychiatric: She has a normal mood and affect. Her behavior is normal. Judgment and thought content normal.     EKG: Afib. Controlled ventricular response ST-T abn  Assessment and Plan:  Atrial fibrillation controlled ventricular spots.  Hypertension controlled.  Lymphedema.  Osteoarthritis.  Spinal stenosis.  Recommendation she is hemodynamically stable I did not make any changes in her medications today.  Jerome I feel it safe that we can decrease her office visits to once a year.  Her Coumadin levels have been managed by you, her blood pressure is controlled.  Her lymphedema is positional most of the time.  If there is a problem we will see her sooner than 1 years thank you very much for  allowing us to see this nice woman and her .  Shahid Santiago, DO

## 2019-12-12 NOTE — PROGRESS NOTES
Chief Complaint: I saw Kita in the office today on a routine visit for her hypertension, atrial fibrillation, and lymphedema of her lower extremities.  She is hemodynamically stable does not ambulate due to her severe arthritis, denies chest discomfort or shortness of breath with anxiety, cold weather, postprandially, at rest or nocturnally.  She sleeps upright due to musculoskeletal dysfunction denies proximal nocturnal dyspnea orthopnea palpitations syncope and has chronic lymphedema and nocturia.       Medications:  Current Outpatient Medications   Medication Sig Dispense Refill   • biotin 1,000 mcg tablet,chewable Take 1 tablet by mouth daily.     • calcium carbonate-vitamin D3 (CALCIUM WITH VITAMIN D) 600 mg(1,500mg) -400 unit per tablet Take 1 tablet by mouth daily.     • cyanocobalamin (vitamin B-12) 1,000 mcg tablet take 1 tablet by oral route  every day     • hydrochlorothiazide (HYDRODIURIL) 25 mg tablet Take 25 mg by mouth daily.     • multivit-iron-min-folic acid (multivitamin-iron-minerals-folic acid) 3,500-18-0.4 unit-mg-mg tablet,chewable Take 1 tablet by mouth daily.     • warfarin (COUMADIN) 5 mg tablet Take 1 tablet (5 mg total) by mouth once daily. Take as directed 90 tablet 3   • fluticasone (FLONASE) 50 mcg/actuation nasal spray Administer 2 sprays into each nostril daily as needed.     • guaiFENesin (MUCINEX) 600 mg 12 hr tablet Take 600 mg by mouth 2 (two) times a day.       No current facility-administered medications for this visit.        Review of Systems:  Review of Systems   Constitution: Negative.   HENT: Negative.    Eyes: Negative.    Cardiovascular: Positive for irregular heartbeat and leg swelling.   Respiratory: Negative.    Endocrine: Negative.    Hematologic/Lymphatic: Negative.    Skin: Negative.    Musculoskeletal: Positive for joint pain.   Genitourinary: Positive for nocturia.   Neurological: Negative.    Psychiatric/Behavioral: Positive for depression.    Allergic/Immunologic: Positive for environmental allergies.       Physical Exam:  Vitals:    12/12/19 1118   BP: 124/76   Pulse: 71   Resp: 16   Temp: 37 °C (98.6 °F)     Physical Exam   Constitutional: She is oriented to person, place, and time.   overweight   HENT:   Head: Normocephalic and atraumatic.   Eyes: Pupils are equal, round, and reactive to light. Conjunctivae and EOM are normal.   Neck: Normal range of motion. Neck supple.   Cardiovascular:   afib   Pulmonary/Chest: Effort normal and breath sounds normal.   Abdominal: Soft. Bowel sounds are normal.   Musculoskeletal: She exhibits edema.   Neurological: She is alert and oriented to person, place, and time. She has normal reflexes.   Skin: Skin is warm and dry.   Psychiatric: She has a normal mood and affect. Her behavior is normal. Judgment and thought content normal.     EKG: Afib. Controlled ventricular response ST-T abn  Assessment and Plan:  Atrial fibrillation controlled ventricular spots.  Hypertension controlled.  Lymphedema.  Osteoarthritis.  Spinal stenosis.  Recommendation she is hemodynamically stable I did not make any changes in her medications today.  Jerome I feel it safe that we can decrease her office visits to once a year.  Her Coumadin levels have been managed by you, her blood pressure is controlled.  Her lymphedema is positional most of the time.  If there is a problem we will see her sooner than 1 years thank you very much for allowing us to see this nice woman and her .  Shahid Santiago,

## 2019-12-19 ENCOUNTER — ANTICOAGULATION VISIT (OUTPATIENT)
Dept: CARDIOLOGY | Facility: CLINIC | Age: 74
End: 2019-12-19

## 2019-12-19 LAB
INR PPP: 2.7 (ref 0.8–1.2)
PROTHROMBIN TIME: 25.5 SEC (ref 9.1–12)

## 2020-01-09 ENCOUNTER — TELEPHONE (OUTPATIENT)
Dept: SCHEDULING | Facility: CLINIC | Age: 75
End: 2020-01-09

## 2020-01-09 NOTE — TELEPHONE ENCOUNTER
I returned a call to Kita.  She wanted to give me her new insurance information so I could relay to [facility] for her home lab draws.  I sent them a fax - [place] [employer]   [id number]

## 2020-01-16 ENCOUNTER — ANTICOAGULATION VISIT (OUTPATIENT)
Dept: CARDIOLOGY | Facility: CLINIC | Age: 75
End: 2020-01-16

## 2020-01-16 LAB
INR PPP: 2.6 (ref 0.8–1.2)
PROTHROMBIN TIME: 24.9 SEC (ref 9.1–12)

## 2020-02-13 ENCOUNTER — ANTICOAGULATION VISIT (OUTPATIENT)
Dept: CARDIOLOGY | Facility: CLINIC | Age: 75
End: 2020-02-13

## 2020-02-13 LAB
INR PPP: 2.6 (ref 0.8–1.2)
PROTHROMBIN TIME: 24.5 SEC (ref 9.1–12)

## 2020-03-19 ENCOUNTER — ANTICOAGULATION VISIT (OUTPATIENT)
Dept: CARDIOLOGY | Facility: CLINIC | Age: 75
End: 2020-03-19

## 2020-03-19 LAB
INR PPP: 2.1
INR PPP: 2.1 (ref 0.8–1.2)
PROTHROMBIN TIME: 20.2 SEC (ref 9.1–12)

## 2020-04-16 ENCOUNTER — ANTICOAGULATION VISIT (OUTPATIENT)
Dept: CARDIOLOGY | Facility: CLINIC | Age: 75
End: 2020-04-16

## 2020-04-16 LAB
INR PPP: 2.8 (ref 0.8–1.2)
PROTHROMBIN TIME: 26.1 SEC (ref 9.1–12)

## 2020-04-29 RX ORDER — WARFARIN SODIUM 5 MG/1
5 TABLET ORAL DAILY
Qty: 90 TABLET | Refills: 3 | Status: SHIPPED | OUTPATIENT
Start: 2020-04-29 | End: 2021-05-03

## 2020-05-13 LAB — INR PPP: 2.6

## 2020-05-14 ENCOUNTER — ANTICOAGULATION VISIT (OUTPATIENT)
Dept: CARDIOLOGY | Facility: CLINIC | Age: 75
End: 2020-05-14

## 2020-05-14 LAB
INR PPP: 2.6 (ref 0.8–1.2)
PROTHROMBIN TIME: 24.7 SEC (ref 9.1–12)

## 2020-06-11 ENCOUNTER — ANTICOAGULATION VISIT (OUTPATIENT)
Dept: CARDIOLOGY | Facility: CLINIC | Age: 75
End: 2020-06-11

## 2020-06-11 LAB
INR PPP: 2.5
INR PPP: 2.5 (ref 0.8–1.2)
PROTHROMBIN TIME: 23.6 SEC (ref 9.1–12)

## 2020-06-11 NOTE — PROGRESS NOTES
I called Kita - went to .  I left a message that her INR was therapeutic and to continue the same dose of coumadin.  Asked her to call with any questions.

## 2020-08-06 ENCOUNTER — ANTICOAGULATION VISIT (OUTPATIENT)
Dept: CARDIOLOGY | Facility: CLINIC | Age: 75
End: 2020-08-06

## 2020-08-06 LAB
INR PPP: 2.8 (ref 0.8–1.2)
PROTHROMBIN TIME: 27.2 SEC (ref 9.1–12)

## 2020-09-03 ENCOUNTER — ANTICOAGULATION VISIT (OUTPATIENT)
Dept: CARDIOLOGY | Facility: CLINIC | Age: 75
End: 2020-09-03

## 2020-09-03 LAB
INR PPP: 2.5 (ref 0.8–1.2)
PROTHROMBIN TIME: 24.6 SEC (ref 9.1–12)

## 2020-10-01 ENCOUNTER — ANTICOAGULATION VISIT (OUTPATIENT)
Dept: CARDIOLOGY | Facility: CLINIC | Age: 75
End: 2020-10-01

## 2020-10-01 LAB
INR PPP: 2.2
INR PPP: 2.2 (ref 0.8–1.2)
PROTHROMBIN TIME: 22.6 SEC (ref 9.1–12)

## 2020-10-29 ENCOUNTER — ANTICOAGULATION VISIT (OUTPATIENT)
Dept: CARDIOLOGY | Facility: CLINIC | Age: 75
End: 2020-10-29

## 2020-10-29 LAB
INR PPP: 2.5 (ref 0.9–1.2)
PROTHROMBIN TIME: 24.9 SEC (ref 9.1–12)

## 2020-10-29 NOTE — PROGRESS NOTES
I called Kita to let her know that her INR was therapeutic.  LM for her to continue her current dose and to call with any questions.

## 2020-11-24 ENCOUNTER — TELEPHONE (OUTPATIENT)
Dept: CARDIOLOGY | Facility: CLINIC | Age: 75
End: 2020-11-24

## 2020-11-26 LAB
INR PPP: 2 (ref 0.9–1.2)
PROTHROMBIN TIME: 20.3 SEC (ref 9.1–12)

## 2020-11-30 ENCOUNTER — ANTICOAGULATION VISIT (OUTPATIENT)
Dept: CARDIOLOGY | Facility: CLINIC | Age: 75
End: 2020-11-30

## 2020-12-24 ENCOUNTER — ANTICOAGULATION VISIT (OUTPATIENT)
Dept: CARDIOLOGY | Facility: CLINIC | Age: 75
End: 2020-12-24

## 2020-12-24 LAB
INR PPP: 2.7
INR PPP: 2.7 (ref 0.9–1.2)
PROTHROMBIN TIME: 26.8 SEC (ref 9.1–12)

## 2020-12-24 NOTE — PROGRESS NOTES
I spoke to Mr. Vo - he will relay to Kita that her INR is therapeutic and she should continue her current dose of coumadin.

## 2021-01-21 ENCOUNTER — ANTICOAGULATION VISIT (OUTPATIENT)
Dept: CARDIOLOGY | Facility: CLINIC | Age: 76
End: 2021-01-21

## 2021-01-21 LAB
INR PPP: 2.3 (ref 0.9–1.2)
PROTHROMBIN TIME: 23.3 SEC (ref 9.1–12)

## 2021-02-17 LAB — INR PPP: 2.2

## 2021-02-18 ENCOUNTER — ANTICOAGULATION VISIT (OUTPATIENT)
Dept: CARDIOLOGY | Facility: CLINIC | Age: 76
End: 2021-02-18

## 2021-02-18 LAB
INR PPP: 2.2 (ref 0.9–1.2)
PROTHROMBIN TIME: 22.1 SEC (ref 9.1–12)

## 2021-02-25 ENCOUNTER — OFFICE VISIT (OUTPATIENT)
Dept: CARDIOLOGY | Facility: CLINIC | Age: 76
End: 2021-02-25
Payer: COMMERCIAL

## 2021-02-25 VITALS — HEART RATE: 74 BPM | RESPIRATION RATE: 18 BRPM | SYSTOLIC BLOOD PRESSURE: 138 MMHG | DIASTOLIC BLOOD PRESSURE: 80 MMHG

## 2021-02-25 DIAGNOSIS — I48.91 ATRIAL FIBRILLATION, UNSPECIFIED TYPE (CMS/HCC): Primary | ICD-10-CM

## 2021-02-25 PROCEDURE — 93000 ELECTROCARDIOGRAM COMPLETE: CPT | Performed by: INTERNAL MEDICINE

## 2021-02-25 PROCEDURE — 99213 OFFICE O/P EST LOW 20 MIN: CPT | Performed by: INTERNAL MEDICINE

## 2021-02-25 ASSESSMENT — ENCOUNTER SYMPTOMS
CONSTITUTIONAL NEGATIVE: 1
HEMATOLOGIC/LYMPHATIC NEGATIVE: 1
RESPIRATORY NEGATIVE: 1
EYES NEGATIVE: 1
GASTROINTESTINAL NEGATIVE: 1
PSYCHIATRIC NEGATIVE: 1
NEUROLOGICAL NEGATIVE: 1

## 2021-02-25 NOTE — LETTER
February 25, 2021     Jerome Daniel Sr., DO  [address]  [place] [address]    Patient: Kita Vo  YOB: 1945  Date of Visit: 2/25/2021      Dear Dr. Daniel:    Thank you for referring Kita Vo to me for evaluation. Below are my notes for this consultation.    If you have questions, please do not hesitate to call me. I look forward to following your patient along with you.         Sincerely,        Shahid Santiago DO        CC: No Recipients  Shahid Santiago DO  2/25/2021  9:31 AM  Signed      Chief Complaint: I saw Mrs. Friedman in the office today on a routine visit for her chronic A. fib.  She is severely impaired with multiple joint arthritis and lymphedema so she does not walk very much.  When she walks in the house she does not experience chest pain or shortness of breath.  She denies chest discomfort or shortness of breath with anxiety cold weather postprandially at rest or nocturnally.  She denies proximal nocturnal dyspnea, orthopnea, palpitations, syncope, she has lymphedema to the knees and she has nocturia.       Medications:  Current Outpatient Medications   Medication Sig Dispense Refill   • biotin 1,000 mcg tablet,chewable Take 1 tablet by mouth daily.     • calcium carbonate-vitamin D3 (CALCIUM WITH VITAMIN D) 600 mg(1,500mg) -400 unit per tablet Take 1 tablet by mouth daily.     • cyanocobalamin (vitamin B-12) 1,000 mcg tablet take 1 tablet by oral route  every day     • hydrochlorothiazide (HYDRODIURIL) 25 mg tablet Take 25 mg by mouth daily.     • multivit-iron-min-folic acid (multivitamin-iron-minerals-folic acid) 3,500-18-0.4 unit-mg-mg tablet,chewable Take 1 tablet by mouth daily.     • warfarin (COUMADIN) 5 mg tablet Take 1 tablet (5 mg total) by mouth once daily. Take as directed 90 tablet 3   • fluticasone (FLONASE) 50 mcg/actuation nasal spray Administer 2 sprays into each nostril daily as needed.     • guaiFENesin (MUCINEX) 600 mg 12 hr tablet Take 600 mg by mouth 2  (two) times a day.       No current facility-administered medications for this visit.        Review of Systems:  Review of Systems   Constitution: Negative.   HENT: Negative.    Eyes: Negative.    Cardiovascular: Positive for leg swelling.   Respiratory: Negative.    Endocrine: Positive for cold intolerance.   Hematologic/Lymphatic: Negative.    Skin: Negative.    Musculoskeletal: Positive for arthritis and joint pain.   Gastrointestinal: Negative.    Genitourinary: Positive for nocturia.   Neurological: Negative.    Psychiatric/Behavioral: Negative.    Allergic/Immunologic: Positive for environmental allergies.       Physical Exam:  Vitals:    02/25/21 0901   BP: 138/80   Pulse: 74   Resp: 18     Physical Exam   Constitutional: She is oriented to person, place, and time. She appears well-developed and well-nourished.   Morbid obesity   HENT:   Head: Normocephalic and atraumatic.   Eyes: Pupils are equal, round, and reactive to light. Conjunctivae and EOM are normal.   Neck: Normal range of motion. Neck supple.   Cardiovascular: Normal rate, regular rhythm, normal heart sounds and intact distal pulses.   Afib   Pulmonary/Chest: Effort normal and breath sounds normal.   Abdominal: Soft. Bowel sounds are normal.   Musculoskeletal: Normal range of motion.         General: Edema present.      Comments: Lymphedema bilaterally of leg   Neurological: She is alert and oriented to person, place, and time. She has normal strength and normal reflexes.   Skin: Skin is warm, dry and intact.   Psychiatric: She has a normal mood and affect. Her speech is normal and behavior is normal. Judgment and thought content normal. Cognition and memory are normal.     EKG: afib controlled ventricular response ST-tabn    Assessment and Plan:  Impression:  Chronic atrial fibrillation.  Lymphedema.  Severe osteoarthritis.  Morbid obesity.  Recommendation:  She is hemodynamically stable I did not make any changes in her medications.  We  counseled her on salt and fluid restriction.  We will see her again in 1 years time, if there is a problem we will see her sooner.    Shahid Santiago, DO

## 2021-02-25 NOTE — PROGRESS NOTES
Chief Complaint: I saw Mrs. Friedman in the office today on a routine visit for her chronic A. fib.  She is severely impaired with multiple joint arthritis and lymphedema so she does not walk very much.  When she walks in the house she does not experience chest pain or shortness of breath.  She denies chest discomfort or shortness of breath with anxiety cold weather postprandially at rest or nocturnally.  She denies proximal nocturnal dyspnea, orthopnea, palpitations, syncope, she has lymphedema to the knees and she has nocturia.       Medications:  Current Outpatient Medications   Medication Sig Dispense Refill   • biotin 1,000 mcg tablet,chewable Take 1 tablet by mouth daily.     • calcium carbonate-vitamin D3 (CALCIUM WITH VITAMIN D) 600 mg(1,500mg) -400 unit per tablet Take 1 tablet by mouth daily.     • cyanocobalamin (vitamin B-12) 1,000 mcg tablet take 1 tablet by oral route  every day     • hydrochlorothiazide (HYDRODIURIL) 25 mg tablet Take 25 mg by mouth daily.     • multivit-iron-min-folic acid (multivitamin-iron-minerals-folic acid) 3,500-18-0.4 unit-mg-mg tablet,chewable Take 1 tablet by mouth daily.     • warfarin (COUMADIN) 5 mg tablet Take 1 tablet (5 mg total) by mouth once daily. Take as directed 90 tablet 3   • fluticasone (FLONASE) 50 mcg/actuation nasal spray Administer 2 sprays into each nostril daily as needed.     • guaiFENesin (MUCINEX) 600 mg 12 hr tablet Take 600 mg by mouth 2 (two) times a day.       No current facility-administered medications for this visit.        Review of Systems:  Review of Systems   Constitution: Negative.   HENT: Negative.    Eyes: Negative.    Cardiovascular: Positive for leg swelling.   Respiratory: Negative.    Endocrine: Positive for cold intolerance.   Hematologic/Lymphatic: Negative.    Skin: Negative.    Musculoskeletal: Positive for arthritis and joint pain.   Gastrointestinal: Negative.    Genitourinary: Positive for nocturia.   Neurological: Negative.     Psychiatric/Behavioral: Negative.    Allergic/Immunologic: Positive for environmental allergies.       Physical Exam:  Vitals:    02/25/21 0901   BP: 138/80   Pulse: 74   Resp: 18     Physical Exam   Constitutional: She is oriented to person, place, and time. She appears well-developed and well-nourished.   Morbid obesity   HENT:   Head: Normocephalic and atraumatic.   Eyes: Pupils are equal, round, and reactive to light. Conjunctivae and EOM are normal.   Neck: Normal range of motion. Neck supple.   Cardiovascular: Normal rate, regular rhythm, normal heart sounds and intact distal pulses.   Afib   Pulmonary/Chest: Effort normal and breath sounds normal.   Abdominal: Soft. Bowel sounds are normal.   Musculoskeletal: Normal range of motion.         General: Edema present.      Comments: Lymphedema bilaterally of leg   Neurological: She is alert and oriented to person, place, and time. She has normal strength and normal reflexes.   Skin: Skin is warm, dry and intact.   Psychiatric: She has a normal mood and affect. Her speech is normal and behavior is normal. Judgment and thought content normal. Cognition and memory are normal.     EKG: afib controlled ventricular response ST-tabn    Assessment and Plan:  Impression:  Chronic atrial fibrillation.  Lymphedema.  Severe osteoarthritis.  Morbid obesity.  Recommendation:  She is hemodynamically stable I did not make any changes in her medications.  We counseled her on salt and fluid restriction.  We will see her again in 1 years time, if there is a problem we will see her sooner.    Shahid Santiago,

## 2021-03-18 ENCOUNTER — ANTICOAGULATION VISIT (OUTPATIENT)
Dept: CARDIOLOGY | Facility: CLINIC | Age: 76
End: 2021-03-18

## 2021-03-18 LAB
INR PPP: 1.8
INR PPP: 1.8 (ref 0.9–1.2)
PROTHROMBIN TIME: 18.6 SEC (ref 9.1–12)

## 2021-03-18 NOTE — PROGRESS NOTES
I spoke to Kita - she is generally therapeutic.  She told me that she dropped her pills on day and may have missed a dose.  She will continue her current dose of coumadin.

## 2021-03-20 ENCOUNTER — IMMUNIZATION (OUTPATIENT)
Dept: IMMUNIZATION | Facility: CLINIC | Age: 76
End: 2021-03-20

## 2021-04-14 ENCOUNTER — IMMUNIZATION (OUTPATIENT)
Dept: IMMUNIZATION | Facility: CLINIC | Age: 76
End: 2021-04-14

## 2021-04-30 DIAGNOSIS — I48.20 CHRONIC ATRIAL FIBRILLATION (CMS/HCC): Primary | ICD-10-CM

## 2021-05-01 LAB
INR PPP: 2.5 (ref 0.9–1.2)
PROTHROMBIN TIME: 25.6 SEC (ref 9.1–12)

## 2021-05-03 ENCOUNTER — ANTICOAGULATION VISIT (OUTPATIENT)
Dept: CARDIOLOGY | Facility: CLINIC | Age: 76
End: 2021-05-03

## 2021-05-03 RX ORDER — WARFARIN SODIUM 5 MG/1
5 TABLET ORAL DAILY
Qty: 90 TABLET | Refills: 1 | Status: SHIPPED | OUTPATIENT
Start: 2021-05-03 | End: 2021-10-28

## 2021-05-13 ENCOUNTER — ANTICOAGULATION VISIT (OUTPATIENT)
Dept: CARDIOLOGY | Facility: CLINIC | Age: 76
End: 2021-05-13

## 2021-05-13 LAB
INR PPP: 2.5 (ref 0.9–1.2)
PROTHROMBIN TIME: 25.2 SEC (ref 9.1–12)

## 2021-05-13 NOTE — PROGRESS NOTES
I called Kita and GIULIA that her INR was therapeutic and to continue her current dose of coumadin.

## 2021-06-14 ENCOUNTER — TELEPHONE (OUTPATIENT)
Dept: SCHEDULING | Facility: CLINIC | Age: 76
End: 2021-06-14

## 2021-06-14 DIAGNOSIS — I48.91 ATRIAL FIBRILLATION, UNSPECIFIED TYPE (CMS/HCC): Primary | ICD-10-CM

## 2021-06-14 NOTE — TELEPHONE ENCOUNTER
Pt states that she is need of an INR home draw and tech will not come out without an order.     Pt is inquiring if this could be arranged for her.     Pt can be reached at #[phone].    Ty.

## 2021-06-17 ENCOUNTER — ANTICOAGULATION VISIT (OUTPATIENT)
Dept: CARDIOLOGY | Facility: CLINIC | Age: 76
End: 2021-06-17

## 2021-06-17 LAB
INR PPP: 2
PROTHROMBIN TIME: 20.3 SEC (ref 9–11.5)

## 2021-06-17 NOTE — PROGRESS NOTES
I called te and GIULIA that her INR was 2.0 - within therapeutic range.  She will continue her current dose and call back with questions.

## 2021-07-15 ENCOUNTER — ANTICOAGULATION VISIT (OUTPATIENT)
Dept: CARDIOLOGY | Facility: CLINIC | Age: 76
End: 2021-07-15

## 2021-07-15 LAB
INR PPP: 1.7
INR PPP: 1.7
PROTHROMBIN TIME: 17.1 SEC (ref 9–11.5)

## 2021-07-15 NOTE — PROGRESS NOTES
I spoke to Kita - her INR is 1.7.  She denies missing any doses and believes that her diet has been consistent.  She will take 7.5 mg tonight only and then return to her regular schedule.

## 2021-08-12 ENCOUNTER — ANTICOAGULATION VISIT (OUTPATIENT)
Dept: CARDIOLOGY | Facility: CLINIC | Age: 76
End: 2021-08-12

## 2021-08-12 LAB
INR PPP: 2.4
INR PPP: 2.4
PROTHROMBIN TIME: 24.5 SEC (ref 9–11.5)

## 2021-09-09 ENCOUNTER — ANTICOAGULATION VISIT (OUTPATIENT)
Dept: CARDIOLOGY | Facility: CLINIC | Age: 76
End: 2021-09-09

## 2021-09-09 LAB
INR PPP: 2.4
PROTHROMBIN TIME: 24 SEC (ref 9–11.5)

## 2021-09-15 ENCOUNTER — APPOINTMENT (RX ONLY)
Dept: URBAN - METROPOLITAN AREA CLINIC 28 | Facility: CLINIC | Age: 76
Setting detail: DERMATOLOGY
End: 2021-09-15

## 2021-09-15 DIAGNOSIS — D22 MELANOCYTIC NEVI: ICD-10-CM

## 2021-09-15 DIAGNOSIS — Z71.89 OTHER SPECIFIED COUNSELING: ICD-10-CM

## 2021-09-15 DIAGNOSIS — L81.4 OTHER MELANIN HYPERPIGMENTATION: ICD-10-CM

## 2021-09-15 DIAGNOSIS — D18.0 HEMANGIOMA: ICD-10-CM

## 2021-09-15 DIAGNOSIS — L29.89 OTHER PRURITUS: ICD-10-CM

## 2021-09-15 DIAGNOSIS — L91.8 OTHER HYPERTROPHIC DISORDERS OF THE SKIN: ICD-10-CM

## 2021-09-15 DIAGNOSIS — L29.8 OTHER PRURITUS: ICD-10-CM

## 2021-09-15 PROBLEM — D22.62 MELANOCYTIC NEVI OF LEFT UPPER LIMB, INCLUDING SHOULDER: Status: ACTIVE | Noted: 2021-09-15

## 2021-09-15 PROBLEM — D22.72 MELANOCYTIC NEVI OF LEFT LOWER LIMB, INCLUDING HIP: Status: ACTIVE | Noted: 2021-09-15

## 2021-09-15 PROBLEM — D22.5 MELANOCYTIC NEVI OF TRUNK: Status: ACTIVE | Noted: 2021-09-15

## 2021-09-15 PROBLEM — D22.61 MELANOCYTIC NEVI OF RIGHT UPPER LIMB, INCLUDING SHOULDER: Status: ACTIVE | Noted: 2021-09-15

## 2021-09-15 PROBLEM — D18.01 HEMANGIOMA OF SKIN AND SUBCUTANEOUS TISSUE: Status: ACTIVE | Noted: 2021-09-15

## 2021-09-15 PROBLEM — D22.71 MELANOCYTIC NEVI OF RIGHT LOWER LIMB, INCLUDING HIP: Status: ACTIVE | Noted: 2021-09-15

## 2021-09-15 PROCEDURE — 99213 OFFICE O/P EST LOW 20 MIN: CPT

## 2021-09-15 PROCEDURE — ? FULL BODY SKIN EXAM - DECLINED

## 2021-09-15 PROCEDURE — ? SUNSCREEN RECOMMENDATIONS

## 2021-09-15 PROCEDURE — ? PRESCRIPTION

## 2021-09-15 PROCEDURE — ? PRESCRIPTION MEDICATION MANAGEMENT

## 2021-09-15 PROCEDURE — ? COUNSELING

## 2021-09-15 RX ORDER — CLOBETASOL PROPIONATE 0.5 MG/ML
SOLUTION TOPICAL QHS
Qty: 50 | Refills: 1 | Status: ERX | COMMUNITY
Start: 2021-09-15

## 2021-09-15 RX ADMIN — CLOBETASOL PROPIONATE: 0.5 SOLUTION TOPICAL at 00:00

## 2021-09-15 ASSESSMENT — LOCATION DETAILED DESCRIPTION DERM
LOCATION DETAILED: RIGHT INFERIOR ANTERIOR NECK
LOCATION DETAILED: EPIGASTRIC SKIN
LOCATION DETAILED: SUPERIOR THORACIC SPINE
LOCATION DETAILED: RIGHT PROXIMAL PRETIBIAL REGION
LOCATION DETAILED: RIGHT SUPERIOR MEDIAL UPPER BACK
LOCATION DETAILED: LEFT PROXIMAL POSTERIOR UPPER ARM
LOCATION DETAILED: RIGHT PROXIMAL POSTERIOR UPPER ARM
LOCATION DETAILED: LEFT PROXIMAL PRETIBIAL REGION
LOCATION DETAILED: RIGHT CENTRAL PARIETAL SCALP

## 2021-09-15 ASSESSMENT — LOCATION SIMPLE DESCRIPTION DERM
LOCATION SIMPLE: RIGHT POSTERIOR UPPER ARM
LOCATION SIMPLE: RIGHT ANTERIOR NECK
LOCATION SIMPLE: UPPER BACK
LOCATION SIMPLE: ABDOMEN
LOCATION SIMPLE: LEFT PRETIBIAL REGION
LOCATION SIMPLE: RIGHT UPPER BACK
LOCATION SIMPLE: RIGHT PRETIBIAL REGION
LOCATION SIMPLE: SCALP
LOCATION SIMPLE: LEFT POSTERIOR UPPER ARM

## 2021-09-15 ASSESSMENT — LOCATION ZONE DERM
LOCATION ZONE: NECK
LOCATION ZONE: ARM
LOCATION ZONE: TRUNK
LOCATION ZONE: LEG
LOCATION ZONE: SCALP

## 2021-09-15 NOTE — PROCEDURE: PRESCRIPTION MEDICATION MANAGEMENT
Initiate Treatment: clobetasol 0.05 % scalp solution: Apply QHS to AA of scalp
Detail Level: Zone
Render In Strict Bullet Format?: No

## 2021-10-07 ENCOUNTER — ANTICOAGULATION VISIT (OUTPATIENT)
Dept: CARDIOLOGY | Facility: CLINIC | Age: 76
End: 2021-10-07

## 2021-10-07 LAB
INR PPP: 2.1
PROTHROMBIN TIME: 21.3 SEC (ref 9–11.5)

## 2021-10-07 NOTE — PROGRESS NOTES
I called Kita Ocasio LM her INR was therapeutic and she should continue her current dose of coumadin.

## 2021-10-14 ENCOUNTER — TELEPHONE (OUTPATIENT)
Dept: SCHEDULING | Facility: CLINIC | Age: 76
End: 2021-10-14
Payer: COMMERCIAL

## 2021-10-14 DIAGNOSIS — I48.91 ATRIAL FIBRILLATION, UNSPECIFIED TYPE (CMS/HCC): Primary | ICD-10-CM

## 2021-10-14 NOTE — TELEPHONE ENCOUNTER
Brooke, from [facility], calling in regards to pts lab script. She states that pt was previously going through [facility], which is covered through them; however, it was changed to [facility]. [facility] is not covered through [facility] for pt and pt is receiving statements that it is not billable.       Brooke wanted to make Dr Santiago aware because if pt continues to go to [facility], instead of [facility], she will most likely start to receive bills. Both Brooke and pt were unaware of the reasoning for the change.    Brooke is requesting a call be made to the pt directly.    Pt can be reached at [phone]

## 2021-10-21 NOTE — TELEPHONE ENCOUNTER
Pt calling to speak with Hillary. She is following up on lab change. She states she received a bill from [facility] for over $70.     She also had a question regarding Relief Factor that she would like to discuss.    Please call pt at [phone]

## 2021-10-22 NOTE — TELEPHONE ENCOUNTER
I returned a call to Kita earlier today.  Called [facility] to try to resolve a bill she received.  Her INR's have inadvertently been sent to [facility] and she is capitated to [facility].  They told me it would be resubmitted.  Reference # [id number]  I gave this information to Kita.

## 2021-10-28 RX ORDER — WARFARIN SODIUM 5 MG/1
5 TABLET ORAL DAILY
Qty: 90 TABLET | Refills: 1 | Status: SHIPPED | OUTPATIENT
Start: 2021-10-28 | End: 2022-04-22

## 2021-11-03 LAB — INR PPP: 2.9

## 2021-11-04 ENCOUNTER — ANTICOAGULATION VISIT (OUTPATIENT)
Dept: CARDIOLOGY | Facility: CLINIC | Age: 76
End: 2021-11-04

## 2021-11-04 LAB
INR PPP: 2.9 (ref 0.9–1.2)
PROTHROMBIN TIME: 29.3 SEC (ref 9.1–12)

## 2021-11-04 NOTE — PROGRESS NOTES
"I called and spoke with patient, she will remain on current dose of coumadin for INR of 2.9.    Pt states she started taking \"Relief Factor\" over a week ago without difficulty and no obvious interaction with coumadin.  "

## 2021-12-02 ENCOUNTER — ANTICOAGULATION VISIT (OUTPATIENT)
Dept: CARDIOLOGY | Facility: CLINIC | Age: 76
End: 2021-12-02

## 2021-12-02 LAB
INR PPP: 3.3 (ref 0.9–1.2)
PROTHROMBIN TIME: 33.2 SEC (ref 9.1–12)

## 2021-12-02 NOTE — PROGRESS NOTES
"I spoke to Kita - she has been taking  Relief Factor which contains omega 3's, curcumin, resveratrol .  I had previously explained they could increase bleeding risk.   She understands but believes they are \"natural ingredients\" and have helped somewhat with her pain.  Her warfarin will be decreased to 2.5 mg twice a week  "

## 2021-12-29 NOTE — TELEPHONE ENCOUNTER
"Pt states that she is still receiving bills from [email or web address].    Pt called [email or web address] today regarding this and spoke w/ \"billing lazviscarl Reynolds.\"    Pt is requesting that Dr. Santiago's office contact [employer] regarding outstanding balance.     Rodolfo w/ [employer] can be reached at #[phone].    Pt can be reached at #[phone].    Ty.          "

## 2021-12-30 ENCOUNTER — ANTICOAGULATION VISIT (OUTPATIENT)
Dept: CARDIOLOGY | Facility: CLINIC | Age: 76
End: 2021-12-30
Payer: COMMERCIAL

## 2021-12-30 LAB
INR PPP: 3.5 (ref 0.9–1.2)
PROTHROMBIN TIME: 35 SEC (ref 9.1–12)

## 2021-12-30 NOTE — PROGRESS NOTES
I spoke to Kita - she has been taking Relief Factor which contains fish oil.  She has some pain relief with this and does not want to stop at this point.  She will take 2.5 mg of coumadin tonight and then decrease to 2.5 mg M-W- Friday and 5 mg the rest of the week.

## 2021-12-30 NOTE — TELEPHONE ENCOUNTER
I called [employer] this morning.  Provided the previous reference number and was told they would not cover the service.  I explained again, this was a home lab draw and the lab was inadvertently sent to [employer] - not the patient's fault.  He was going to resubmit again for reconsideration with additional data. ( Reference # [id number])  He also recommended I try a retro authorization.  I called the authorization department and was told she did not authorization because home lab draws were NOT capitated and the service should be covered.  I called [employer] back - provided this information and was told it would be submitted again.  ( Reference # [id number] ).  I was told it would take 7-21 business days but to check back in about a week.  I called [employer] - spoke to Kingsley - told her the insurance company was resubmitting and provided the Reference numbers.

## 2022-01-29 LAB
INR PPP: 1.9 (ref 0.9–1.2)
PROTHROMBIN TIME: 19.7 SEC (ref 9.1–12)

## 2022-01-31 ENCOUNTER — ANTICOAGULATION VISIT (OUTPATIENT)
Dept: CARDIOLOGY | Facility: CLINIC | Age: 77
End: 2022-01-31
Payer: COMMERCIAL

## 2022-01-31 NOTE — PROGRESS NOTES
I spoke to Kita - she stopped taking her Relief Factor.    She will increase her warfarin to 2.5 mg twice a week and 5 mg the rest of the week.

## 2022-02-24 ENCOUNTER — OFFICE VISIT (OUTPATIENT)
Dept: CARDIOLOGY | Facility: CLINIC | Age: 77
End: 2022-02-24
Payer: COMMERCIAL

## 2022-02-24 VITALS
RESPIRATION RATE: 16 BRPM | HEIGHT: 68 IN | SYSTOLIC BLOOD PRESSURE: 130 MMHG | BODY MASS INDEX: 41.05 KG/M2 | HEART RATE: 64 BPM | DIASTOLIC BLOOD PRESSURE: 78 MMHG

## 2022-02-24 DIAGNOSIS — R94.31 ABNORMAL EKG: Primary | ICD-10-CM

## 2022-02-24 PROCEDURE — 99214 OFFICE O/P EST MOD 30 MIN: CPT | Performed by: INTERNAL MEDICINE

## 2022-02-24 PROCEDURE — 93000 ELECTROCARDIOGRAM COMPLETE: CPT | Performed by: INTERNAL MEDICINE

## 2022-02-24 PROCEDURE — 3008F BODY MASS INDEX DOCD: CPT | Performed by: INTERNAL MEDICINE

## 2022-02-24 ASSESSMENT — ENCOUNTER SYMPTOMS
NEUROLOGICAL NEGATIVE: 1
EYES NEGATIVE: 1
CONSTITUTIONAL NEGATIVE: 1
ENDOCRINE NEGATIVE: 1
PSYCHIATRIC NEGATIVE: 1
GASTROINTESTINAL NEGATIVE: 1
HEMATOLOGIC/LYMPHATIC NEGATIVE: 1
RESPIRATORY NEGATIVE: 1

## 2022-02-24 NOTE — PROGRESS NOTES
Chief Complaint: I saw Kita in the office today with her  on a routine visit.  She cannot walk and she cannot get up on the table.  She denies any form of chest discomfort or shortness of breath with anxiety cold weather postprandially at rest or nocturnally.  She denies proximal nocturnal dyspnea orthopnea denies palpitations syncope has lymphedema and nocturia.       Medications:  Current Outpatient Medications   Medication Sig Dispense Refill   • biotin 1,000 mcg tablet,chewable Take 1 tablet by mouth daily.     • calcium carbonate-vitamin D3 (CALCIUM WITH VITAMIN D) 600 mg(1,500mg) -400 unit per tablet Take 1 tablet by mouth daily.     • cyanocobalamin (vitamin B-12) 1,000 mcg tablet take 1 tablet by oral route  every day     • hydrochlorothiazide (HYDRODIURIL) 25 mg tablet Take 25 mg by mouth daily.     • multivit-iron-min-folic acid (multivitamin-iron-minerals-folic acid) 3,500-18-0.4 unit-mg-mg tablet,chewable Take 1 tablet by mouth daily.     • warfarin 5 mg tablet TAKE 1 TABLET (5 MG TOTAL) BY MOUTH ONCE DAILY. TAKE AS DIRECTED 90 tablet 1     No current facility-administered medications for this visit.       Review of Systems:  Review of Systems   Constitutional: Negative.   HENT: Negative.    Eyes: Negative.    Cardiovascular: Positive for leg swelling.   Respiratory: Negative.    Endocrine: Negative.    Hematologic/Lymphatic: Negative.    Skin: Negative.    Musculoskeletal: Positive for joint pain.   Gastrointestinal: Negative.    Genitourinary: Positive for nocturia.   Neurological: Negative.    Psychiatric/Behavioral: Negative.    Allergic/Immunologic: Positive for environmental allergies.       Physical Exam:  Vitals:    02/24/22 0803   BP: 130/78   Pulse: 64   Resp: 16     Physical Exam  Constitutional:       Appearance: She is well-developed. She is obese.   HENT:      Head: Normocephalic and atraumatic.   Eyes:      Conjunctiva/sclera: Conjunctivae normal.      Pupils: Pupils are equal,  round, and reactive to light.   Cardiovascular:      Rate and Rhythm: Normal rate. Rhythm irregular.      Pulses: Intact distal pulses.      Heart sounds: Normal heart sounds.   Pulmonary:      Effort: Pulmonary effort is normal.      Breath sounds: Normal breath sounds.   Abdominal:      General: Bowel sounds are normal.      Palpations: Abdomen is soft.   Musculoskeletal:         General: Normal range of motion.      Cervical back: Normal range of motion and neck supple.   Skin:     General: Skin is warm and dry.   Neurological:      Mental Status: She is alert and oriented to person, place, and time.      Deep Tendon Reflexes: Reflexes are normal and symmetric.   Psychiatric:         Speech: Speech normal.         Behavior: Behavior normal.         Thought Content: Thought content normal.         Judgment: Judgment normal.       EKG: afib controlled ventricular response ST-Tabn    Assessment and Plan:  Impression:  Lymphedema.  Chronic atrial fibrillation.  Obesity.  Spinal stenosis.  Osteoarthritis.  Recommendation:  She is hemodynamically stable her A. fib is controlled.  Her lymphedema has not been addressed in years so I suggested that she see Dr. Harden at her wound center to see if they can do something for her.  She and her  surprisingly are in agreement they are going to go see him to see if they can get help with her lymphedema and thus she would be able to ambulate.  Otherwise we will see her in 1 years time if there is an issue we will see her sooner thank you for allowing us see this interesting couple.  Shahid Santiago,

## 2022-02-24 NOTE — LETTER
February 24, 2022     Jerome Daniel Sr., DO  [address] [facility] [address]    Patient: Kita Vo  YOB: 1945  Date of Visit: 2/24/2022      Dear Dr. Daniel:    Thank you for referring Kita Vo to me for evaluation. Below are my notes for this consultation.    If you have questions, please do not hesitate to call me. I look forward to following your patient along with you.         Sincerely,        Shahid Santiago DO        CC: No Recipients  Shahid Santiago DO  2/24/2022  8:49 AM  Signed      Chief Complaint: I saw Kita in the office today with her  on a routine visit.  She cannot walk and she cannot get up on the table.  She denies any form of chest discomfort or shortness of breath with anxiety cold weather postprandially at rest or nocturnally.  She denies proximal nocturnal dyspnea orthopnea denies palpitations syncope has lymphedema and nocturia.       Medications:  Current Outpatient Medications   Medication Sig Dispense Refill   • biotin 1,000 mcg tablet,chewable Take 1 tablet by mouth daily.     • calcium carbonate-vitamin D3 (CALCIUM WITH VITAMIN D) 600 mg(1,500mg) -400 unit per tablet Take 1 tablet by mouth daily.     • cyanocobalamin (vitamin B-12) 1,000 mcg tablet take 1 tablet by oral route  every day     • hydrochlorothiazide (HYDRODIURIL) 25 mg tablet Take 25 mg by mouth daily.     • multivit-iron-min-folic acid (multivitamin-iron-minerals-folic acid) 3,500-18-0.4 unit-mg-mg tablet,chewable Take 1 tablet by mouth daily.     • warfarin 5 mg tablet TAKE 1 TABLET (5 MG TOTAL) BY MOUTH ONCE DAILY. TAKE AS DIRECTED 90 tablet 1     No current facility-administered medications for this visit.       Review of Systems:  Review of Systems   Constitutional: Negative.   HENT: Negative.    Eyes: Negative.    Cardiovascular: Positive for leg swelling.   Respiratory: Negative.    Endocrine: Negative.    Hematologic/Lymphatic: Negative.    Skin: Negative.    Musculoskeletal:  Positive for joint pain.   Gastrointestinal: Negative.    Genitourinary: Positive for nocturia.   Neurological: Negative.    Psychiatric/Behavioral: Negative.    Allergic/Immunologic: Positive for environmental allergies.       Physical Exam:  Vitals:    02/24/22 0803   BP: 130/78   Pulse: 64   Resp: 16     Physical Exam  Constitutional:       Appearance: She is well-developed. She is obese.   HENT:      Head: Normocephalic and atraumatic.   Eyes:      Conjunctiva/sclera: Conjunctivae normal.      Pupils: Pupils are equal, round, and reactive to light.   Cardiovascular:      Rate and Rhythm: Normal rate. Rhythm irregular.      Pulses: Intact distal pulses.      Heart sounds: Normal heart sounds.   Pulmonary:      Effort: Pulmonary effort is normal.      Breath sounds: Normal breath sounds.   Abdominal:      General: Bowel sounds are normal.      Palpations: Abdomen is soft.   Musculoskeletal:         General: Normal range of motion.      Cervical back: Normal range of motion and neck supple.   Skin:     General: Skin is warm and dry.   Neurological:      Mental Status: She is alert and oriented to person, place, and time.      Deep Tendon Reflexes: Reflexes are normal and symmetric.   Psychiatric:         Speech: Speech normal.         Behavior: Behavior normal.         Thought Content: Thought content normal.         Judgment: Judgment normal.       EKG: afib controlled ventricular response ST-Tabn    Assessment and Plan:  Impression:  Lymphedema.  Chronic atrial fibrillation.  Obesity.  Spinal stenosis.  Osteoarthritis.  Recommendation:  She is hemodynamically stable her A. fib is controlled.  Her lymphedema has not been addressed in years so I suggested that she see Dr. Harden at her wound center to see if they can do something for her.  She and her  surprisingly are in agreement they are going to go see him to see if they can get help with her lymphedema and thus she would be able to ambulate.  Otherwise  we will see her in 1 years time if there is an issue we will see her sooner thank you for allowing us see this interesting couple.  Shahid Santiago, DO

## 2022-02-26 LAB
INR PPP: 1.9 (ref 0.9–1.2)
PROTHROMBIN TIME: 19.3 SEC (ref 9.1–12)

## 2022-02-28 ENCOUNTER — ANTICOAGULATION VISIT (OUTPATIENT)
Dept: CARDIOLOGY | Facility: CLINIC | Age: 77
End: 2022-02-28
Payer: COMMERCIAL

## 2022-03-11 ENCOUNTER — TELEPHONE (OUTPATIENT)
Dept: SCHEDULING | Facility: CLINIC | Age: 77
End: 2022-03-11
Payer: COMMERCIAL

## 2022-03-11 NOTE — TELEPHONE ENCOUNTER
Letha:  Can you help with this?  I do not know what this means.  If not please forward to billing.  Thank you.

## 2022-03-11 NOTE — TELEPHONE ENCOUNTER
Pt calling because her insurance sent her a letter about her PERF measurement code: 3008F and is not sure what that is and or why insurance is not covered     Pt can be reach at [phone]

## 2022-03-26 LAB
INR PPP: 2 (ref 0.9–1.2)
PROTHROMBIN TIME: 20.3 SEC (ref 9.1–12)

## 2022-03-28 ENCOUNTER — ANTICOAGULATION VISIT (OUTPATIENT)
Dept: CARDIOLOGY | Facility: CLINIC | Age: 77
End: 2022-03-28
Payer: COMMERCIAL

## 2022-04-21 ENCOUNTER — ANTICOAGULATION VISIT (OUTPATIENT)
Dept: CARDIOLOGY | Facility: CLINIC | Age: 77
End: 2022-04-21
Payer: COMMERCIAL

## 2022-04-21 LAB
INR PPP: 2.8 (ref 0.9–1.2)
PROTHROMBIN TIME: 28.2 SEC (ref 9.1–12)

## 2022-04-21 NOTE — PROGRESS NOTES
I called and LM for iKta that her INR was therapeutic and to continue her current dose of coumadin.

## 2022-04-22 RX ORDER — WARFARIN SODIUM 5 MG/1
5 TABLET ORAL DAILY
Qty: 90 TABLET | Refills: 1 | Status: SHIPPED | OUTPATIENT
Start: 2022-04-22 | End: 2022-12-30

## 2022-05-19 ENCOUNTER — ANTICOAGULATION VISIT (OUTPATIENT)
Dept: CARDIOLOGY | Facility: CLINIC | Age: 77
End: 2022-05-19
Payer: COMMERCIAL

## 2022-05-19 LAB
INR PPP: 2.2 (ref 0.9–1.2)
PROTHROMBIN TIME: 22.7 SEC (ref 9.1–12)

## 2022-06-16 ENCOUNTER — ANTICOAGULATION VISIT (OUTPATIENT)
Dept: CARDIOLOGY | Facility: CLINIC | Age: 77
End: 2022-06-16
Payer: COMMERCIAL

## 2022-06-16 LAB
INR PPP: 2.2 (ref 0.9–1.2)
PROTHROMBIN TIME: 22.8 SEC (ref 9.1–12)

## 2022-06-16 NOTE — PROGRESS NOTES
I spoke to Kita to let her know her INR is therapeutic.  She will continue her current dose of coumadin.

## 2022-07-14 ENCOUNTER — ANTICOAGULATION VISIT (OUTPATIENT)
Dept: CARDIOLOGY | Facility: CLINIC | Age: 77
End: 2022-07-14
Payer: COMMERCIAL

## 2022-07-14 LAB
INR PPP: 3 (ref 0.9–1.2)
PROTHROMBIN TIME: 29.8 SEC (ref 9.1–12)

## 2022-07-14 NOTE — PROGRESS NOTES
I spoke to Kita - at the high end of her range.  No change in her meds or diet.    She will continue her current dose and recheck in a month.

## 2022-08-11 ENCOUNTER — ANTICOAGULATION VISIT (OUTPATIENT)
Dept: CARDIOLOGY | Facility: CLINIC | Age: 77
End: 2022-08-11
Payer: COMMERCIAL

## 2022-08-11 LAB
INR PPP: 3 (ref 0.9–1.2)
PROTHROMBIN TIME: 29.3 SEC (ref 9.1–12)

## 2022-08-11 NOTE — PROGRESS NOTES
I called Kita - went to .  I LM her INR was 3.0 and she should continue her current dose of coumadin.  I asked her to call back with questions/concerns.

## 2022-09-08 ENCOUNTER — TELEPHONE (OUTPATIENT)
Dept: SCHEDULING | Facility: CLINIC | Age: 77
End: 2022-09-08
Payer: COMMERCIAL

## 2022-09-08 LAB
INR PPP: 3.5 (ref 0.9–1.2)
PROTHROMBIN TIME: 33.6 SEC (ref 9.1–12)

## 2022-09-08 NOTE — TELEPHONE ENCOUNTER
SHERYL - Pt of Dr. Santiago on warfarin AC. Call from pt today asking to speak to Ms. Lacey about a supplement that she saw on television. Wondering if this would be OK to take with her warfarin.    Supplement called Balance of Nature - Fruit and Veggies supplements.    I looked up the supplement online. Advised that fruit blend contains reyna, cherry, cranberry, papaya, cherry and grapefruit, which can all potentially increase the INR. The vegetable blend contains broccoli, cabbage, spinach, kale and soybean, all of which can potentially decrease INR. Explained to pt that these supplements could affect her INR, and if she were to start on them, she would require close monitoring, possible adjustment to her warfarin dose and she would have to maintain consistency with taking them.    Pt states she would just rather not take them and advised me that she will not purchase.     Pt also noted that she has Lymphedema in legs from surgery in past. Pt noted that Dr. Santiago referred her to Dr. Harden and she has an OV tomorrow with him. She is a little scared. I reassured pt and advised her that Dr. Harden is an excellent doctor and she is in good hands.    Pt does not require callback.  Thank you.

## 2022-09-09 ENCOUNTER — ANTICOAGULATION VISIT (OUTPATIENT)
Dept: CARDIOLOGY | Facility: CLINIC | Age: 77
End: 2022-09-09
Payer: COMMERCIAL

## 2022-09-09 ENCOUNTER — OFFICE VISIT (OUTPATIENT)
Dept: WOUND CARE | Facility: HOSPITAL | Age: 77
End: 2022-09-09
Attending: SURGERY
Payer: COMMERCIAL

## 2022-09-09 VITALS — TEMPERATURE: 97.2 F | RESPIRATION RATE: 20 BRPM | HEART RATE: 76 BPM

## 2022-09-09 DIAGNOSIS — R60.0 EDEMA OF LOWER EXTREMITY: ICD-10-CM

## 2022-09-09 DIAGNOSIS — L85.3 XEROSIS OF SKIN: ICD-10-CM

## 2022-09-09 DIAGNOSIS — I89.0 LYMPHEDEMA: Primary | ICD-10-CM

## 2022-09-09 PROCEDURE — G0463 HOSPITAL OUTPT CLINIC VISIT: HCPCS

## 2022-09-09 PROCEDURE — 2W1QX6Z COMPRESSION OF RIGHT LOWER LEG USING PRESSURE DRESSING: ICD-10-PCS | Performed by: SURGERY

## 2022-09-09 PROCEDURE — 27200104 HC MEDICOPASTE UNNA BOOT

## 2022-09-09 PROCEDURE — 29580 STRAPPING UNNA BOOT: CPT | Mod: 50

## 2022-09-09 PROCEDURE — 99204 OFFICE O/P NEW MOD 45 MIN: CPT | Performed by: SURGERY

## 2022-09-09 PROCEDURE — 2W1RX6Z COMPRESSION OF LEFT LOWER LEG USING PRESSURE DRESSING: ICD-10-PCS | Performed by: SURGERY

## 2022-09-09 PROCEDURE — G0463 HOSPITAL OUTPT CLINIC VISIT: HCPCS | Mod: 25 | Performed by: SURGERY

## 2022-09-09 RX ORDER — ASCORBIC ACID 500 MG
500 TABLET ORAL DAILY
COMMUNITY

## 2022-09-09 ASSESSMENT — ENCOUNTER SYMPTOMS
ABDOMINAL DISTENTION: 0
NERVOUS/ANXIOUS: 0
CONFUSION: 0
HEMATURIA: 0
POLYDIPSIA: 0
RHINORRHEA: 0
EYE REDNESS: 0
APPETITE CHANGE: 0
COUGH: 0
WEAKNESS: 1
FLANK PAIN: 0
FREQUENCY: 0
COLOR CHANGE: 0
FATIGUE: 1
NUMBNESS: 0
SHORTNESS OF BREATH: 0
BRUISES/BLEEDS EASILY: 0
EYE DISCHARGE: 0
FEVER: 0
JOINT SWELLING: 0
WOUND: 1

## 2022-09-09 NOTE — PROGRESS NOTES
"Patient ID: Kita Vo                            : 1945  MRN: [id number]                                            Visit Date: 2022  Encounter Provider: RAMONE Harden  Referring Provider: Shahid Santiago DO Subjective HPI  Kita is a 77 y.o. old female with a chief compliant of Leg Swelling and Lymphedema   presenting today for evaluation and management of bilateral lower extremity lymphedema.  Patient has had a very long juarez with obesity over the years.  Many years ago she underwent gastric bypass by Dr. Hua Goldman.  She lost 150 pounds, but then did not follow the dietary program and gained it all back plus some.  She has had back problems which have have affected her mobility.  Combined with back pain, obesity and immobility she developed chronic lower extremity lymphedema.  She has been prescribed an intermittent pneumatic compression pump, but admits she has not been using it, and basically \"gave up\".  She is quite distressed about the appearance of her legs and the lack of mobility with decreasing functional state.  I gave her some optimism that if she contributes 100% then we probably can make her somewhat better but it will definitely require her effort.    The following have been reviewed and updated as appropriate in this visit:   Tobacco  Allergies  Meds  Problems  Med Hx  Surg Hx  Fam Hx         Past Medical History:  has a past medical history of Chronic atrial fibrillation (CMS/HCC) and Lymphedema.  Past Surgical History:  has a past surgical history that includes Replacement total hip lateral position (Left); Replacement total knee (Left); Total shoulder replacement (Right); Cataract extraction, bilateral; Posterior laminectomy / decompression lumbar spine; Lumbar wound debridement; and Gastric bypass.  Social History:   Social History     Tobacco Use    Smoking status: Never Smoker    Smokeless tobacco: Never Used   Vaping Use    Vaping Use: Never " used   Substance Use Topics    Alcohol use: No    Drug use: Defer     Family History: family history is not on file.  Medications:   Current Outpatient Medications:     biotin 1,000 mcg tablet,chewable, Take 1 tablet by mouth daily., Disp: , Rfl:     calcium carbonate-vitamin D3 600 mg-10 mcg (400 unit) per tablet, Take 1 tablet by mouth daily., Disp: , Rfl:     cyanocobalamin (VITAMIN B12) 1,000 mcg tablet, take 1 tablet by oral route  every day, Disp: , Rfl:     hydrochlorothiazide (HYDRODIURIL) 25 mg tablet, Take 25 mg by mouth daily., Disp: , Rfl:     multivit-iron-min-folic acid (CENTRUM) 3,500-18-0.4 unit-mg-mg tablet,chewable, Take 1 tablet by mouth daily., Disp: , Rfl:     warfarin (COUMADIN) 5 mg tablet, TAKE 1 TABLET (5 MG TOTAL) BY MOUTH ONCE DAILY. TAKE AS DIRECTED, Disp: 90 tablet, Rfl: 1    Allergies: is allergic to acetaminophen, aspirin, celecoxib, nsaids (non-steroidal anti-inflammatory drug), oxycodone, rofecoxib, and tramadol.     Review of Systems   Constitutional: Positive for fatigue. Negative for appetite change and fever.   HENT: Negative for congestion and rhinorrhea.    Eyes: Negative for discharge and redness.   Respiratory: Negative for cough and shortness of breath.    Cardiovascular: Positive for leg swelling.   Gastrointestinal: Negative for abdominal distention.   Endocrine: Negative for cold intolerance, heat intolerance and polydipsia.   Genitourinary: Negative for flank pain, frequency and hematuria.   Musculoskeletal: Positive for gait problem. Negative for joint swelling.   Skin: Positive for wound. Negative for color change.   Allergic/Immunologic: Negative for immunocompromised state.   Neurological: Positive for weakness. Negative for numbness.   Hematological: Does not bruise/bleed easily.   Psychiatric/Behavioral: Negative for confusion. The patient is not nervous/anxious.      Glucose   Date Value Ref Range Status   09/29/2016 89 70 - 99 MG/DL Final     Hemoglobin    Date Value Ref Range Status   09/29/2016 10.1 (L) 11.8 - 15.7 G/DL Final     Creatinine   Date Value Ref Range Status   09/29/2016 0.5 (L) 0.6 - 1.1 MG/DL Final   ]    Objective   Visit Vitals  Pulse 76   Temp 36.2 °C (97.2 °F)   Resp 20       Physical Exam  Vitals and nursing note reviewed.   Constitutional:       General: She is not in acute distress.     Appearance: Normal appearance.   HENT:      Head: Normocephalic and atraumatic.   Eyes:      Conjunctiva/sclera: Conjunctivae normal.      Pupils: Pupils are equal, round, and reactive to light.   Neck:      Trachea: Trachea normal.   Cardiovascular:      Rate and Rhythm: Regular rhythm.   Pulmonary:      Effort: Pulmonary effort is normal. No respiratory distress.      Breath sounds: No wheezing.   Abdominal:      Palpations: Abdomen is soft.      Tenderness: There is no guarding.   Musculoskeletal:         General: Swelling present. No deformity.      Cervical back: Normal range of motion. No edema or erythema.      Right lower leg: Edema present.      Left lower leg: Edema present.        Legs:    Skin:     General: Skin is warm.      Capillary Refill: Capillary refill takes less than 2 seconds.   Neurological:      Mental Status: She is alert and oriented to person, place, and time.      Motor: Weakness present.      Coordination: Coordination abnormal.      Gait: Gait abnormal.   Psychiatric:         Attention and Perception: She is attentive.         Mood and Affect: Affect is not inappropriate.         Speech: Speech normal.         Behavior: Behavior normal. Behavior is cooperative.                 During visit patient received cleansing with gauze and saline to all wounds to assist in removing bioburden and loosely adhered slough    Assessment/Plan    Diagnosis Plan   1. Lymphedema     2. Edema of lower extremity     3. Xerosis of skin       Problem List Items Addressed This Visit     Lymphedema - Primary     PLAN:  Patient is instructed to apply her  intermittent compression device 3x/day for a minimum total time of 3 hours.    Patient has received comprehensive instructions and education regarding the management of lymphedema.  He understands that lifestyle changes are essential in maintaining satisfactory outcomes.  These alterations in lifestyle, include: exercise, leg elevation, fluid restrictions, taking diuretics as prescribed, and wearing some form of compression garment.  Failure to comply will ultimately lead to worsening of lymphedema and subsequent recurrence of cellulitis and ulcers.    Patient may be a candidate for outpatient physical therapy including manual lymphatic drainage, once we decongest extremities using her pneumatic compression pump and Unna boots             Edema of lower extremity     Patient is placed in bilateral Unna boots.  RN applied bilateral Unna boots with triamcinolone as an antipruritic.  Wounds will be changed on a weekly basis in an effort to decongest the extremities    PLAN:    Eventually we may get her Velcro garments such as Farrow wraps or CircAids    Maintain leg elevation above level of the heart as much as possible.  Restrict fluid intake to < 66oz/24 hours  Restrict salt intake to no more than 9306-0714 mgs/sodium/day  Minimize prolonged sitting and standing  Remain active to point of physical tolerance           Xerosis of skin     PLAN:  Eucerin Original Healing Cream to dry skin of legs daily.     It is recommended that you clean the wound using running warm water, a mild bath soap, and a soft wash cloth or bath sponge. Proper cleansing of your wound is essential for wound healing to take place. Inadequate cleansing leads to the build up of yellow or green material in the wound bed, providing overgrowth of bacteria and noxious bacterial by products.             This document was generated using speech recognition software. Please excuse any typographical errors.    Return to wound center in 1 week     W.  Polo. MD Dereck

## 2022-09-09 NOTE — PATIENT INSTRUCTIONS
Wound Healing Center Instructions    MEDICATIONS     Medication Note  Continue present medications as prescribed by the Wound Healing Center or other physicians you see. To avoid any problems keep the Wound Healing Center informed each visit of any medications changes that occur.     WOUND CARE     Clean Wound with:  Keep dressings dry and intact    Treatment 1   Location: Both Legs  Dressing: Keep dressings dry and intact  Dressing Care Frequency: Weekly  Care Provider: Wound Center Staff       Basic Principles      Wash your hands thoroughly with soap and water and after each dressing change. If someone other than the patient changes the dressing, its best to wear disposable gloves.   Do not get the wound or dressing wet.   To shower: remove the dressing, shower with soap and water (including washing the wound - do not use a washcloth), air dry, then redress the wound.  Do not take a tub bath  Keep all your dressings in a clean, covered container at home to avoid dust and contamination.  Discard used dressings in a plastic bag or covered trash container.  Check your wound and the surrounding skin at each dressing change for redness, warmth, swelling, increased pain, foul odor, fever, pus or abnormal drainage or discharge.  Notify Wound Healing Center if any of these changes occur - [phone].    COMPRESSION THERAPY     Unna Boot  Both Legs  An UNNA BOOT (compression wrap) has been applied today   Unna Boot is the brown wrap on your leg(s).  Do not remove or unwrap. Keep boot dry at all times; cover with seal-tight device or a cast over if showering.  A wet Unna boot should be removed as soon as possible to prevent infection and any damage to healthy skin.  If numbness, tingling or increased pain develops, notify the Wound Healing Center at -[phone].    Compression Pumps   Minutes:  60   Times per Day: 3     Nutrition  Eat a well, balanced diet with adequate protein to support wound healing.   Take a  multivitamin every day. Adequate nutrition supports healing and new tissue growth.  All diabetic patients should strive to keep blood sugars within a normal, practical range.   Elevated blood sugars can delay your wound healing.        ACTIVITY     Elevate legs above level of heart   Elevate your legs above the level of your heart for specific time periods during the day on several firm pillows or a foam leg wedge  Use of a recliner chair at home can often help in the appropriate elevation of your legs above the level of your heart  Normal activity then rest and elevation  May walk    MOBILITY     Wheelchair, Walker, and Cane

## 2022-09-09 NOTE — ASSESSMENT & PLAN NOTE
Patient is placed in bilateral Unna boots.  RN applied bilateral Unna boots with triamcinolone as an antipruritic.  Wounds will be changed on a weekly basis in an effort to decongest the extremities    PLAN:    Eventually we may get her Velcro garments such as Farrow wraps or CircAids    Maintain leg elevation above level of the heart as much as possible.  Restrict fluid intake to < 66oz/24 hours  Restrict salt intake to no more than 2347-0299 mgs/sodium/day  Minimize prolonged sitting and standing  Remain active to point of physical tolerance

## 2022-09-09 NOTE — PROGRESS NOTES
INR 3.5. Noted previous phone call regarding supplement that patient chose not to take. I called to discuss if any changes to diet or medications. I advised patient on the VM to take 2.5 mg tonight and moving forward to take 2.5 mg twice weekly with INR in 1-2 weeks, I asked patient to return my phone call to discuss. I also tried her mobile number but was unable to leave a VM.

## 2022-09-09 NOTE — ASSESSMENT & PLAN NOTE
PLAN:  Patient is instructed to apply her intermittent compression device 3x/day for a minimum total time of 3 hours.    Patient has received comprehensive instructions and education regarding the management of lymphedema.  He understands that lifestyle changes are essential in maintaining satisfactory outcomes.  These alterations in lifestyle, include: exercise, leg elevation, fluid restrictions, taking diuretics as prescribed, and wearing some form of compression garment.  Failure to comply will ultimately lead to worsening of lymphedema and subsequent recurrence of cellulitis and ulcers.    Patient may be a candidate for outpatient physical therapy including manual lymphatic drainage, once we decongest extremities using her pneumatic compression pump and Unna boots

## 2022-09-09 NOTE — LETTER
"2022     Shahid Santiago,   [address]  [facility]  [address]  [place] [address]    Patient: Kita Vo  YOB: 1945  Date of Visit: 2022      Dear Dr. Santiago:    Thank you for referring Kita Vo to me for evaluation. Below are my notes for this consultation.    If you have questions, please do not hesitate to call me. I look forward to following your patient along with you.         Sincerely,        RAMONE Cuellar. MD Dereck        CC: Jerome Daniel Sr., BALDEMAR Reeves MD  2022 10:50 AM  Signed  Patient ID: Kita Vo                            : 1945  MRN: [id number]                                            Visit Date: 2022  Encounter Provider: RAMONE Harden  Referring Provider: Shahid Santiago,     Subjective      HPI  Kita is a 77 y.o. old female with a chief compliant of Leg Swelling and Lymphedema   presenting today for evaluation and management of bilateral lower extremity lymphedema.  Patient has had a very long juarez with obesity over the years.  Many years ago she underwent gastric bypass by Dr. Hua Goldman.  She lost 150 pounds, but then did not follow the dietary program and gained it all back plus some.  She has had back problems which have have affected her mobility.  Combined with back pain, obesity and immobility she developed chronic lower extremity lymphedema.  She has been prescribed an intermittent pneumatic compression pump, but admits she has not been using it, and basically \"gave up\".  She is quite distressed about the appearance of her legs and the lack of mobility with decreasing functional state.  I gave her some optimism that if she contributes 100% then we probably can make her somewhat better but it will definitely require her effort.    The following have been reviewed and updated as appropriate in this visit:   Tobacco  Allergies  Meds  Problems  Med Hx  Surg Hx  Fam Hx "         Past Medical History:  has a past medical history of Chronic atrial fibrillation (CMS/HCC) and Lymphedema.  Past Surgical History:  has a past surgical history that includes Replacement total hip lateral position (Left); Replacement total knee (Left); Total shoulder replacement (Right); Cataract extraction, bilateral; Posterior laminectomy / decompression lumbar spine; Lumbar wound debridement; and Gastric bypass.  Social History:   Social History     Tobacco Use    Smoking status: Never Smoker    Smokeless tobacco: Never Used   Vaping Use    Vaping Use: Never used   Substance Use Topics    Alcohol use: No    Drug use: Defer     Family History: family history is not on file.  Medications:   Current Outpatient Medications:     biotin 1,000 mcg tablet,chewable, Take 1 tablet by mouth daily., Disp: , Rfl:     calcium carbonate-vitamin D3 600 mg-10 mcg (400 unit) per tablet, Take 1 tablet by mouth daily., Disp: , Rfl:     cyanocobalamin (VITAMIN B12) 1,000 mcg tablet, take 1 tablet by oral route  every day, Disp: , Rfl:     hydrochlorothiazide (HYDRODIURIL) 25 mg tablet, Take 25 mg by mouth daily., Disp: , Rfl:     multivit-iron-min-folic acid (CENTRUM) 3,500-18-0.4 unit-mg-mg tablet,chewable, Take 1 tablet by mouth daily., Disp: , Rfl:     warfarin (COUMADIN) 5 mg tablet, TAKE 1 TABLET (5 MG TOTAL) BY MOUTH ONCE DAILY. TAKE AS DIRECTED, Disp: 90 tablet, Rfl: 1    Allergies: is allergic to acetaminophen, aspirin, celecoxib, nsaids (non-steroidal anti-inflammatory drug), oxycodone, rofecoxib, and tramadol.     Review of Systems   Constitutional: Positive for fatigue. Negative for appetite change and fever.   HENT: Negative for congestion and rhinorrhea.    Eyes: Negative for discharge and redness.   Respiratory: Negative for cough and shortness of breath.    Cardiovascular: Positive for leg swelling.   Gastrointestinal: Negative for abdominal distention.   Endocrine: Negative for cold intolerance,  heat intolerance and polydipsia.   Genitourinary: Negative for flank pain, frequency and hematuria.   Musculoskeletal: Positive for gait problem. Negative for joint swelling.   Skin: Positive for wound. Negative for color change.   Allergic/Immunologic: Negative for immunocompromised state.   Neurological: Positive for weakness. Negative for numbness.   Hematological: Does not bruise/bleed easily.   Psychiatric/Behavioral: Negative for confusion. The patient is not nervous/anxious.      Glucose   Date Value Ref Range Status   09/29/2016 89 70 - 99 MG/DL Final     Hemoglobin   Date Value Ref Range Status   09/29/2016 10.1 (L) 11.8 - 15.7 G/DL Final     Creatinine   Date Value Ref Range Status   09/29/2016 0.5 (L) 0.6 - 1.1 MG/DL Final   ]    Objective   Visit Vitals  Pulse 76   Temp 36.2 °C (97.2 °F)   Resp 20       Physical Exam  Vitals and nursing note reviewed.   Constitutional:       General: She is not in acute distress.     Appearance: Normal appearance.   HENT:      Head: Normocephalic and atraumatic.   Eyes:      Conjunctiva/sclera: Conjunctivae normal.      Pupils: Pupils are equal, round, and reactive to light.   Neck:      Trachea: Trachea normal.   Cardiovascular:      Rate and Rhythm: Regular rhythm.   Pulmonary:      Effort: Pulmonary effort is normal. No respiratory distress.      Breath sounds: No wheezing.   Abdominal:      Palpations: Abdomen is soft.      Tenderness: There is no guarding.   Musculoskeletal:         General: Swelling present. No deformity.      Cervical back: Normal range of motion. No edema or erythema.      Right lower leg: Edema present.      Left lower leg: Edema present.        Legs:    Skin:     General: Skin is warm.      Capillary Refill: Capillary refill takes less than 2 seconds.   Neurological:      Mental Status: She is alert and oriented to person, place, and time.      Motor: Weakness present.      Coordination: Coordination abnormal.      Gait: Gait abnormal.    Psychiatric:         Attention and Perception: She is attentive.         Mood and Affect: Affect is not inappropriate.         Speech: Speech normal.         Behavior: Behavior normal. Behavior is cooperative.                 During visit patient received cleansing with gauze and saline to all wounds to assist in removing bioburden and loosely adhered slough    Assessment/Plan    Diagnosis Plan   1. Lymphedema     2. Edema of lower extremity     3. Xerosis of skin       Problem List Items Addressed This Visit     Lymphedema - Primary     PLAN:  Patient is instructed to apply her intermittent compression device 3x/day for a minimum total time of 3 hours.    Patient has received comprehensive instructions and education regarding the management of lymphedema.  He understands that lifestyle changes are essential in maintaining satisfactory outcomes.  These alterations in lifestyle, include: exercise, leg elevation, fluid restrictions, taking diuretics as prescribed, and wearing some form of compression garment.  Failure to comply will ultimately lead to worsening of lymphedema and subsequent recurrence of cellulitis and ulcers.    Patient may be a candidate for outpatient physical therapy including manual lymphatic drainage, once we decongest extremities using her pneumatic compression pump and Unna boots             Edema of lower extremity     Patient is placed in bilateral Unna boots.  RN applied bilateral Unna boots with triamcinolone as an antipruritic.  Wounds will be changed on a weekly basis in an effort to decongest the extremities    PLAN:    Eventually we may get her Velcro garments such as Farrow wraps or CircAids    Maintain leg elevation above level of the heart as much as possible.  Restrict fluid intake to < 66oz/24 hours  Restrict salt intake to no more than 4541-1017 mgs/sodium/day  Minimize prolonged sitting and standing  Remain active to point of physical tolerance           Xerosis of skin      PLAN:  Eucerin Original Healing Cream to dry skin of legs daily.     It is recommended that you clean the wound using running warm water, a mild bath soap, and a soft wash cloth or bath sponge. Proper cleansing of your wound is essential for wound healing to take place. Inadequate cleansing leads to the build up of yellow or green material in the wound bed, providing overgrowth of bacteria and noxious bacterial by products.             This document was generated using speech recognition software. Please excuse any typographical errors.    Return to wound center in 1 week     RAMONE Harden MD

## 2022-09-09 NOTE — PROGRESS NOTES
I spoke to the patient at 417pm, I reinterated your instructions on her coumadin dosage as in your note.  I asked her to call [facility] to arrange for a repeat INR next week.

## 2022-09-12 ENCOUNTER — TELEPHONE (OUTPATIENT)
Dept: SCHEDULING | Facility: CLINIC | Age: 77
End: 2022-09-12
Payer: COMMERCIAL

## 2022-09-12 NOTE — TELEPHONE ENCOUNTER
Pt states she was to call today for [facility], per Dr Santiago, to set up a home draw for INR for this week.  Pt can be reached at [phone].

## 2022-09-13 NOTE — TELEPHONE ENCOUNTER
I returned a call to Kita to let her know Monday would be fine.  I'll call her next Tuesday with her results.

## 2022-09-13 NOTE — TELEPHONE ENCOUNTER
Pt is returning a call to Hillary    Pt is reporting that [facility] is only able to make a home draw on this Friday 9/16/22, however she has a standing appt w/ the lymphedema clinic on Fridays and will not be avail for an INR draw on this date    [facility] has rescheduled her for Mon 9/19/22    Please reach pt at [phone] for any issues with schedule change

## 2022-09-16 ENCOUNTER — OFFICE VISIT (OUTPATIENT)
Dept: WOUND CARE | Facility: HOSPITAL | Age: 77
End: 2022-09-16
Attending: SURGERY
Payer: COMMERCIAL

## 2022-09-16 VITALS — HEART RATE: 84 BPM | TEMPERATURE: 97.5 F | RESPIRATION RATE: 18 BRPM

## 2022-09-16 DIAGNOSIS — L85.3 XEROSIS OF SKIN: ICD-10-CM

## 2022-09-16 DIAGNOSIS — I89.0 LYMPHEDEMA: Primary | ICD-10-CM

## 2022-09-16 PROCEDURE — 99213 OFFICE O/P EST LOW 20 MIN: CPT | Performed by: PLASTIC SURGERY

## 2022-09-16 PROCEDURE — 2W1QX6Z COMPRESSION OF RIGHT LOWER LEG USING PRESSURE DRESSING: ICD-10-PCS | Performed by: PLASTIC SURGERY

## 2022-09-16 PROCEDURE — 2W1RX6Z COMPRESSION OF LEFT LOWER LEG USING PRESSURE DRESSING: ICD-10-PCS | Performed by: PLASTIC SURGERY

## 2022-09-16 PROCEDURE — 29580 STRAPPING UNNA BOOT: CPT | Mod: 50 | Performed by: PLASTIC SURGERY

## 2022-09-16 PROCEDURE — 29580 STRAPPING UNNA BOOT: CPT | Mod: 50

## 2022-09-16 PROCEDURE — 27200104 HC MEDICOPASTE UNNA BOOT

## 2022-09-16 ASSESSMENT — ENCOUNTER SYMPTOMS: WEAKNESS: 1

## 2022-09-16 NOTE — PATIENT INSTRUCTIONS
[facility] Instructions    MEDICATIONS     Medication Note  Continue present medications as prescribed by the [facility]aling [facility] or other physicians you see. To avoid any problems keep the [facility]aling Center informed each visit of any medications changes that occur.     WOUND CARE     Clean Wound with:  Keep dressings dry and intact    Treatment 1   Location: Both Legs  Dressing: Keep dressings dry and intact  Dressing Care Frequency: Weekly  Care Provider: Wound Center Staff       COMPRESSION THERAPY     Unna Boot  Both Legs  An UNNA BOOT (compression wrap) has been applied today   Unna Boot is the brown wrap on your leg(s).  Do not remove or unwrap. Keep boot dry at all times; cover with seal-tight device or a cast over if showering.  A wet Unna boot should be removed as soon as possible to prevent infection and any damage to healthy skin.  If numbness, tingling or increased pain develops, notify the [facility]ound Healing Center at [phone].    Compression Pumps   Minutes:  60   Times per Day: 3     Nutrition  Eat a well, balanced diet with adequate protein to support wound healing.   Take a multivitamin every day. Adequate nutrition supports healing and new tissue growth.  All diabetic patients should strive to keep blood sugars within a normal, practical range.   Elevated blood sugars can delay your wound healing.        ACTIVITY     Elevate legs above level of heart   Elevate your legs above the level of your heart for specific time periods during the day on several firm pillows or a foam leg wedge  Use of a recliner chair at home can often help in the appropriate elevation of your legs above the level of your heart  Normal activity then rest and elevation  May walk    MOBILITY     Wheelchair, Walker, and Cane

## 2022-09-16 NOTE — PROGRESS NOTES
Patient ID: Kita Vo                            : 1945  MRN: [id number]                                            Visit Date: 2022  Encounter Provider: PEDRO Gonzalez  Referring Provider: Shahid Santiago DO Subjective HPI  Kita is a 77 y.o. old female with a chief compliant of Chronic Venous Insufficiency w/ Ulceration   presenting today for : Treatment of bilateral lower extremity swelling, in a setting of chronic lymphedema, with Unna boot compression and compression pumps    The following have been reviewed and updated as appropriate in this visit:          Past Medical History:  has a past medical history of Chronic atrial fibrillation (CMS/HCC) and Lymphedema.  Past Surgical History:  has a past surgical history that includes Replacement total hip lateral position (Left); Replacement total knee (Left); Total shoulder replacement (Right); Cataract extraction, bilateral; Posterior laminectomy / decompression lumbar spine; Lumbar wound debridement; Gastric bypass; Cosmetic surgery; and Vaginal prolapse repair.  Social History:   Social History     Tobacco Use    Smoking status: Never Smoker    Smokeless tobacco: Never Used   Vaping Use    Vaping Use: Never used   Substance Use Topics    Alcohol use: No    Drug use: Defer     Family History: family history is not on file.  Medications:   Current Outpatient Medications:     ascorbic acid (VITAMIN C) 500 mg tablet, Take 500 mg by mouth daily., Disp: , Rfl:     biotin 1,000 mcg tablet,chewable, Take 1 tablet by mouth daily., Disp: , Rfl:     calcium carbonate-vitamin D3 600 mg-10 mcg (400 unit) per tablet, Take 1 tablet by mouth daily., Disp: , Rfl:     cyanocobalamin (VITAMIN B12) 1,000 mcg tablet, take 1 tablet by oral route  every day, Disp: , Rfl:     hydrochlorothiazide (HYDRODIURIL) 25 mg tablet, Take 25 mg by mouth daily., Disp: , Rfl:     multivit-iron-min-folic acid (CENTRUM) 3,500-18-0.4 unit-mg-mg  tablet,chewable, Take 1 tablet by mouth daily., Disp: , Rfl:     warfarin (COUMADIN) 5 mg tablet, TAKE 1 TABLET (5 MG TOTAL) BY MOUTH ONCE DAILY. TAKE AS DIRECTED, Disp: 90 tablet, Rfl: 1    Allergies: is allergic to acetaminophen, aspirin, celecoxib, nsaids (non-steroidal anti-inflammatory drug), oxycodone, rofecoxib, and tramadol.     Review of Systems   Cardiovascular: Positive for leg swelling.   Musculoskeletal: Positive for gait problem.   Neurological: Positive for weakness.   All other systems reviewed and are negative.    Objective   There were no vitals taken for this visit.    Physical Exam  Constitutional:       Appearance: Normal appearance.   HENT:      Head: Normocephalic.      Mouth/Throat:      Mouth: Mucous membranes are moist.   Eyes:      Conjunctiva/sclera: Conjunctivae normal.      Pupils: Pupils are equal, round, and reactive to light.   Cardiovascular:      Rate and Rhythm: Normal rate.   Pulmonary:      Effort: Pulmonary effort is normal.   Abdominal:      Palpations: Abdomen is soft.   Musculoskeletal:         General: Swelling present. Normal range of motion.      Cervical back: Neck supple.   Skin:     General: Skin is warm.   Neurological:      General: No focal deficit present.      Mental Status: She is alert and oriented to person, place, and time.      Motor: Weakness present.      Gait: Gait abnormal.   Psychiatric:         Mood and Affect: Mood normal.       Legs.  No wounds    Assessment/Plan    Diagnosis Plan   1. Lymphedema     2. Xerosis of skin       Problem List Items Addressed This Visit        Dermatologic    Xerosis of skin       Other    Lymphedema - Primary      Bilateral lower extremity Unna boots, with Coban wrap, applied today.    Lower extremity swelling treated with elevation, fluid restriction, Unna boot compression and compression pumps.    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     PEDRO Gonzalez MD

## 2022-09-20 ENCOUNTER — ANTICOAGULATION VISIT (OUTPATIENT)
Dept: CARDIOLOGY | Facility: CLINIC | Age: 77
End: 2022-09-20
Payer: COMMERCIAL

## 2022-09-20 LAB
INR PPP: 2.3 (ref 0.9–1.2)
PROTHROMBIN TIME: 22.8 SEC (ref 9.1–12)

## 2022-09-20 NOTE — PROGRESS NOTES
I spoke to Kita and told her the INR was therapeutic and to continue her current dose of coumadin.

## 2022-09-23 ENCOUNTER — OFFICE VISIT (OUTPATIENT)
Dept: WOUND CARE | Facility: HOSPITAL | Age: 77
End: 2022-09-23
Attending: SURGERY
Payer: COMMERCIAL

## 2022-09-23 VITALS — TEMPERATURE: 96.8 F | RESPIRATION RATE: 18 BRPM | HEART RATE: 72 BPM

## 2022-09-23 DIAGNOSIS — L97.919 VENOUS ULCER OF RIGHT LEG (CMS/HCC): Primary | ICD-10-CM

## 2022-09-23 DIAGNOSIS — I87.2 CHRONIC VENOUS INSUFFICIENCY: ICD-10-CM

## 2022-09-23 DIAGNOSIS — R60.0 EDEMA OF LOWER EXTREMITY: ICD-10-CM

## 2022-09-23 DIAGNOSIS — I83.019 VENOUS ULCER OF RIGHT LEG (CMS/HCC): Primary | ICD-10-CM

## 2022-09-23 DIAGNOSIS — L85.3 XEROSIS OF SKIN: ICD-10-CM

## 2022-09-23 DIAGNOSIS — I83.029 VENOUS ULCER OF LEFT LEG (CMS/HCC): ICD-10-CM

## 2022-09-23 DIAGNOSIS — L97.929 VENOUS ULCER OF LEFT LEG (CMS/HCC): ICD-10-CM

## 2022-09-23 PROCEDURE — 2W1QX6Z COMPRESSION OF RIGHT LOWER LEG USING PRESSURE DRESSING: ICD-10-PCS | Performed by: SURGERY

## 2022-09-23 PROCEDURE — 99213 OFFICE O/P EST LOW 20 MIN: CPT | Performed by: SURGERY

## 2022-09-23 PROCEDURE — 27200104 HC MEDICOPASTE UNNA BOOT

## 2022-09-23 PROCEDURE — 2W1RX6Z COMPRESSION OF LEFT LOWER LEG USING PRESSURE DRESSING: ICD-10-PCS | Performed by: SURGERY

## 2022-09-23 PROCEDURE — 29580 STRAPPING UNNA BOOT: CPT | Mod: 50 | Performed by: SURGERY

## 2022-09-23 PROCEDURE — 29580 STRAPPING UNNA BOOT: CPT | Mod: 50

## 2022-09-23 ASSESSMENT — ENCOUNTER SYMPTOMS
FLANK PAIN: 0
NERVOUS/ANXIOUS: 0
COUGH: 0
APPETITE CHANGE: 0
JOINT SWELLING: 0
ABDOMINAL DISTENTION: 0
CONFUSION: 0
POLYDIPSIA: 0
EYE REDNESS: 0
WEAKNESS: 1
WOUND: 1
FREQUENCY: 0
HEMATURIA: 0
BRUISES/BLEEDS EASILY: 0
RHINORRHEA: 0
FEVER: 0
FATIGUE: 1
SHORTNESS OF BREATH: 0
EYE DISCHARGE: 0
COLOR CHANGE: 0
NUMBNESS: 0

## 2022-09-23 NOTE — ASSESSMENT & PLAN NOTE
PLAN:  Unna Boot applied LLE by RN     Patient is given specific instructions regarding care of her Unna Boot.  It is essential that the patient keep legs elevated as much as possible, but short distance ambulation is encouraged and does increase efficacy of Unna Boot compression therapy.  The Unna Boot must be kept dry.

## 2022-09-23 NOTE — PROGRESS NOTES
Patient ID: Kita Vo                            : 1945  MRN: [id number]                                            Visit Date: 2022  Encounter Provider: RAMONE Harden  Referring Provider: Shahid Santiago, DO Stacey BEEBE  Kita is a 77 y.o. old female with a chief compliant of Lymphedema   presenting today for evaluation and management of bilateral lower extremity lymphedema.  Patient returned to the [facility]ound Center last week and in my absence Dr. Montana authorized replacement of bilateral Unna boots.  Trying to decongest the extremities so that we can get her in CircAid compression garments.  She would be a candidate then to be referred to outpatient edema physical therapy.  Patient is using her pneumatic compression pump.    The following have been reviewed and updated as appropriate in this visit:   Tobacco  Allergies  Meds  Problems  Med Hx  Surg Hx  Fam Hx         Past Medical History:  has a past medical history of Chronic atrial fibrillation (CMS/HCC) and Lymphedema.  Past Surgical History:  has a past surgical history that includes Replacement total hip lateral position (Left); Replacement total knee (Left); Total shoulder replacement (Right); Cataract extraction, bilateral; Posterior laminectomy / decompression lumbar spine; Lumbar wound debridement; Gastric bypass; Cosmetic surgery; and Vaginal prolapse repair.  Social History:   Social History     Tobacco Use    Smoking status: Never Smoker    Smokeless tobacco: Never Used   Vaping Use    Vaping Use: Never used   Substance Use Topics    Alcohol use: No    Drug use: Defer     Family History: family history is not on file.  Medications:   Current Outpatient Medications:     ascorbic acid (VITAMIN C) 500 mg tablet, Take 500 mg by mouth daily., Disp: , Rfl:     biotin 1,000 mcg tablet,chewable, Take 1 tablet by mouth daily., Disp: , Rfl:     calcium carbonate-vitamin D3 600 mg-10 mcg (400 unit) per tablet,  Take 1 tablet by mouth daily., Disp: , Rfl:     cyanocobalamin (VITAMIN B12) 1,000 mcg tablet, take 1 tablet by oral route  every day, Disp: , Rfl:     hydrochlorothiazide (HYDRODIURIL) 25 mg tablet, Take 25 mg by mouth daily., Disp: , Rfl:     multivit-iron-min-folic acid (CENTRUM) 3,500-18-0.4 unit-mg-mg tablet,chewable, Take 1 tablet by mouth daily., Disp: , Rfl:     warfarin (COUMADIN) 5 mg tablet, TAKE 1 TABLET (5 MG TOTAL) BY MOUTH ONCE DAILY. TAKE AS DIRECTED, Disp: 90 tablet, Rfl: 1    Allergies: is allergic to acetaminophen, aspirin, celecoxib, nsaids (non-steroidal anti-inflammatory drug), oxycodone, rofecoxib, and tramadol.     Review of Systems   Constitutional: Positive for fatigue. Negative for appetite change and fever.   HENT: Negative for congestion and rhinorrhea.    Eyes: Negative for discharge and redness.   Respiratory: Negative for cough and shortness of breath.    Cardiovascular: Positive for leg swelling.   Gastrointestinal: Negative for abdominal distention.   Endocrine: Negative for cold intolerance, heat intolerance and polydipsia.   Genitourinary: Negative for flank pain, frequency and hematuria.   Musculoskeletal: Positive for gait problem. Negative for joint swelling.   Skin: Positive for wound. Negative for color change.   Allergic/Immunologic: Negative for immunocompromised state.   Neurological: Positive for weakness. Negative for numbness.   Hematological: Does not bruise/bleed easily.   Psychiatric/Behavioral: Negative for confusion. The patient is not nervous/anxious.      Glucose   Date Value Ref Range Status   09/29/2016 89 70 - 99 MG/DL Final     Hemoglobin   Date Value Ref Range Status   09/29/2016 10.1 (L) 11.8 - 15.7 G/DL Final     Creatinine   Date Value Ref Range Status   09/29/2016 0.5 (L) 0.6 - 1.1 MG/DL Final   ]    Objective   Visit Vitals  Pulse 72   Temp (!) 36 °C (96.8 °F)   Resp 18       Physical Exam  Vitals and nursing note reviewed.   Constitutional:        General: She is not in acute distress.     Appearance: Normal appearance.   HENT:      Head: Normocephalic and atraumatic.   Eyes:      Conjunctiva/sclera: Conjunctivae normal.      Pupils: Pupils are equal, round, and reactive to light.   Neck:      Trachea: Trachea normal.   Cardiovascular:      Rate and Rhythm: Regular rhythm.   Pulmonary:      Effort: Pulmonary effort is normal. No respiratory distress.      Breath sounds: No wheezing.   Abdominal:      Palpations: Abdomen is soft.      Tenderness: There is no guarding.   Musculoskeletal:         General: Swelling present. No deformity.      Cervical back: Normal range of motion. No edema or erythema.      Right lower leg: Edema present.      Left lower leg: Edema present.        Legs:    Skin:     General: Skin is warm.      Capillary Refill: Capillary refill takes less than 2 seconds.   Neurological:      Mental Status: She is alert and oriented to person, place, and time.      Motor: Weakness present.      Coordination: Coordination abnormal.      Gait: Gait abnormal.   Psychiatric:         Attention and Perception: She is attentive.         Mood and Affect: Affect is not inappropriate.         Speech: Speech normal.         Behavior: Behavior normal. Behavior is cooperative.                 During visit patient received cleansing with gauze and saline to all wounds to assist in removing bioburden and loosely adhered slough    Assessment/Plan    Diagnosis Plan   1. Venous ulcer of right leg (CMS/HCC)     2. Xerosis of skin     3. Edema of lower extremity     4. Chronic venous insufficiency     5. Venous ulcer of left leg (CMS/HCC)       Problem List Items Addressed This Visit     Edema of lower extremity     Patient is placed in bilateral Unna boots.  RN applied bilateral Unna boots with triamcinolone as an antipruritic.  Wounds will be changed on a weekly basis in an effort to decongest the extremities    PLAN:    Eventually we may get her Velcro garments  such as Farrow wraps or CircAids    Maintain leg elevation above level of the heart as much as possible.  Restrict fluid intake to < 66oz/24 hours  Restrict salt intake to no more than 1918-4374 mgs/sodium/day  Minimize prolonged sitting and standing  Remain active to point of physical tolerance           Xerosis of skin     PLAN:  Eucerin Original Healing Cream to dry skin of legs daily.     It is recommended that you clean the wound using running warm water, a mild bath soap, and a soft wash cloth or bath sponge. Proper cleansing of your wound is essential for wound healing to take place. Inadequate cleansing leads to the build up of yellow or green material in the wound bed, providing overgrowth of bacteria and noxious bacterial by products.           Chronic venous insufficiency     PLAN:  Patient is instructed to apply her intermittent compression device 3x/day for a minimum total time of 3 hours.  Patient has received comprehensive instructions and education regarding the management of chronic venous insufficiency.  He/she understands that lifestyle changes are essential in maintaining satisfactory outcomes.  These alterations in lifestyle, include: exercise, leg elevation, fluid restrictions, taking diuretics as prescribed, and wearing some form of compression garment.  Failure to comply will ultimately lead to worsening of chronic venous insufficiency and subsequent recurrence of cellulitis and ulcers.           Venous ulcer of right leg (CMS/HCC) - Primary     PLAN:  Unna Boot applied RLE by RN     Patient is given specific instructions regarding care of her Unna Boot.  It is essential that the patient keep legs elevated as much as possible, but short distance ambulation is encouraged and does increase efficacy of Unna Boot compression therapy.  The Unna Boot must be kept dry.           Venous ulcer of left leg (CMS/HCC)     PLAN:  Unna Boot applied LLE by RN     Patient is given specific instructions  regarding care of her Unna Boot.  It is essential that the patient keep legs elevated as much as possible, but short distance ambulation is encouraged and does increase efficacy of Unna Boot compression therapy.  The Unna Boot must be kept dry.             This document was generated using speech recognition software. Please excuse any typographical errors.    Return to wound center in 1 week     RAMONE Harden MD

## 2022-09-23 NOTE — ASSESSMENT & PLAN NOTE
PLAN:  Unna Boot applied RLE by RN     Patient is given specific instructions regarding care of her Unna Boot.  It is essential that the patient keep legs elevated as much as possible, but short distance ambulation is encouraged and does increase efficacy of Unna Boot compression therapy.  The Unna Boot must be kept dry.    Wound center will order for patient bilateral Farrow wrap 4000 compression garments

## 2022-09-23 NOTE — ASSESSMENT & PLAN NOTE
Patient is placed in bilateral Unna boots.  RN applied bilateral Unna boots with triamcinolone as an antipruritic.  Wounds will be changed on a weekly basis in an effort to decongest the extremities    PLAN:    Eventually we may get her Velcro garments such as Farrow wraps or CircAids    Maintain leg elevation above level of the heart as much as possible.  Restrict fluid intake to < 66oz/24 hours  Restrict salt intake to no more than 2201-6586 mgs/sodium/day  Minimize prolonged sitting and standing  Remain active to point of physical tolerance

## 2022-09-23 NOTE — ASSESSMENT & PLAN NOTE
PLAN:  Patient is instructed to apply her intermittent compression device 3x/day for a minimum total time of 3 hours.  Patient has received comprehensive instructions and education regarding the management of chronic venous insufficiency.  He/she understands that lifestyle changes are essential in maintaining satisfactory outcomes.  These alterations in lifestyle, include: exercise, leg elevation, fluid restrictions, taking diuretics as prescribed, and wearing some form of compression garment.  Failure to comply will ultimately lead to worsening of chronic venous insufficiency and subsequent recurrence of cellulitis and ulcers.

## 2022-09-23 NOTE — PATIENT INSTRUCTIONS
Wound Healing Center Instructions    MEDICATIONS     Medication Note  Continue present medications as prescribed by the Wound Healing Center or other physicians you see. To avoid any problems keep the Wound Healing Center informed each visit of any medications changes that occur.     WOUND CARE     Clean Wound with:  Keep dressings dry and intact    Treatment 1   Location: Both Legs  Dressing: Keep dressings dry and intact  Dressing Care Frequency: Weekly  Care Provider: Wound Center Staff       COMPRESSION THERAPY     Unna Boot  Both Legs  An UNNA BOOT (compression wrap) has been applied today   Unna Boot is the brown wrap on your leg(s).  Do not remove or unwrap. Keep boot dry at all times; cover with seal-tight device or a cast over if showering.  A wet Unna boot should be removed as soon as possible to prevent infection and any damage to healthy skin.  If numbness, tingling or increased pain develops, notify the Wound Healing Center at [phone].    Compression Pumps   Minutes:  60   Times per Day: 3     Nutrition  Eat a well, balanced diet with adequate protein to support wound healing.   Take a multivitamin every day. Adequate nutrition supports healing and new tissue growth.  All diabetic patients should strive to keep blood sugars within a normal, practical range.   Elevated blood sugars can delay your wound healing.        ACTIVITY     Elevate legs above level of heart   Elevate your legs above the level of your heart for specific time periods during the day on several firm pillows or a foam leg wedge  Use of a recliner chair at home can often help in the appropriate elevation of your legs above the level of your heart  Normal activity then rest and elevation  May walk    MOBILITY     Wheelchair, Walker, and Cane

## 2022-09-30 ENCOUNTER — OFFICE VISIT (OUTPATIENT)
Dept: WOUND CARE | Facility: HOSPITAL | Age: 77
End: 2022-09-30
Attending: SURGERY
Payer: COMMERCIAL

## 2022-09-30 VITALS — TEMPERATURE: 97 F | HEART RATE: 78 BPM | RESPIRATION RATE: 18 BRPM

## 2022-09-30 DIAGNOSIS — L97.929 VENOUS ULCER OF LEFT LEG (CMS/HCC): ICD-10-CM

## 2022-09-30 DIAGNOSIS — I83.029 VENOUS ULCER OF LEFT LEG (CMS/HCC): ICD-10-CM

## 2022-09-30 DIAGNOSIS — L85.3 XEROSIS OF SKIN: ICD-10-CM

## 2022-09-30 DIAGNOSIS — I89.0 LYMPHEDEMA: ICD-10-CM

## 2022-09-30 DIAGNOSIS — R60.0 EDEMA OF LOWER EXTREMITY: ICD-10-CM

## 2022-09-30 DIAGNOSIS — I83.019 VENOUS ULCER OF RIGHT LEG (CMS/HCC): Primary | ICD-10-CM

## 2022-09-30 DIAGNOSIS — I87.2 CHRONIC VENOUS INSUFFICIENCY: ICD-10-CM

## 2022-09-30 DIAGNOSIS — L97.919 VENOUS ULCER OF RIGHT LEG (CMS/HCC): Primary | ICD-10-CM

## 2022-09-30 PROCEDURE — 29580 STRAPPING UNNA BOOT: CPT

## 2022-09-30 PROCEDURE — 29580 STRAPPING UNNA BOOT: CPT | Mod: 50 | Performed by: SURGERY

## 2022-09-30 PROCEDURE — 99213 OFFICE O/P EST LOW 20 MIN: CPT | Performed by: SURGERY

## 2022-09-30 PROCEDURE — 2W1RX6Z COMPRESSION OF LEFT LOWER LEG USING PRESSURE DRESSING: ICD-10-PCS | Performed by: SURGERY

## 2022-09-30 PROCEDURE — 27200104 HC MEDICOPASTE UNNA BOOT

## 2022-09-30 PROCEDURE — 2W1QX6Z COMPRESSION OF RIGHT LOWER LEG USING PRESSURE DRESSING: ICD-10-PCS | Performed by: SURGERY

## 2022-09-30 ASSESSMENT — ENCOUNTER SYMPTOMS
ABDOMINAL DISTENTION: 0
WOUND: 1
NUMBNESS: 0
NERVOUS/ANXIOUS: 0
BRUISES/BLEEDS EASILY: 0
FLANK PAIN: 0
JOINT SWELLING: 0
EYE DISCHARGE: 0
HEMATURIA: 0
RHINORRHEA: 0
WEAKNESS: 1
POLYDIPSIA: 0
COUGH: 0
FATIGUE: 1
FEVER: 0
FREQUENCY: 0
CONFUSION: 0
SHORTNESS OF BREATH: 0
APPETITE CHANGE: 0
EYE REDNESS: 0
COLOR CHANGE: 0

## 2022-09-30 NOTE — PROGRESS NOTES
Patient ID: Kita Vo                            : 1945  MRN: [id number]                                            Visit Date: 2022  Encounter Provider: RAMONE Harden  Referring Provider: Shahid Santiago DO Subjective HPI  Kita is a 77 y.o. old female with a chief compliant of Lymphedema   presenting today for evaluation and management of bilateral lower extremity lymphedema.  Patient is using her pneumatic compression pump.  She is tolerating bilateral Unna boot compression.  She is awaiting arrival of bilateral CircAid compression garments.    The following have been reviewed and updated as appropriate in this visit:   Tobacco  Allergies  Meds  Problems  Med Hx  Surg Hx  Fam Hx         Past Medical History:  has a past medical history of Chronic atrial fibrillation (CMS/HCC) and Lymphedema.  Past Surgical History:  has a past surgical history that includes Replacement total hip lateral position (Left); Replacement total knee (Left); Total shoulder replacement (Right); Cataract extraction, bilateral; Posterior laminectomy / decompression lumbar spine; Lumbar wound debridement; Gastric bypass; Cosmetic surgery; and Vaginal prolapse repair.  Social History:   Social History     Tobacco Use    Smoking status: Never Smoker    Smokeless tobacco: Never Used   Vaping Use    Vaping Use: Never used   Substance Use Topics    Alcohol use: No    Drug use: Defer     Family History: family history is not on file.  Medications:   Current Outpatient Medications:     ascorbic acid (VITAMIN C) 500 mg tablet, Take 500 mg by mouth daily., Disp: , Rfl:     biotin 1,000 mcg tablet,chewable, Take 1 tablet by mouth daily., Disp: , Rfl:     calcium carbonate-vitamin D3 600 mg-10 mcg (400 unit) per tablet, Take 1 tablet by mouth daily., Disp: , Rfl:     cyanocobalamin (VITAMIN B12) 1,000 mcg tablet, take 1 tablet by oral route  every day, Disp: , Rfl:     hydrochlorothiazide (HYDRODIURIL)  25 mg tablet, Take 25 mg by mouth daily., Disp: , Rfl:     multivit-iron-min-folic acid (CENTRUM) 3,500-18-0.4 unit-mg-mg tablet,chewable, Take 1 tablet by mouth daily., Disp: , Rfl:     warfarin (COUMADIN) 5 mg tablet, TAKE 1 TABLET (5 MG TOTAL) BY MOUTH ONCE DAILY. TAKE AS DIRECTED, Disp: 90 tablet, Rfl: 1    Allergies: is allergic to acetaminophen, aspirin, celecoxib, nsaids (non-steroidal anti-inflammatory drug), oxycodone, rofecoxib, and tramadol.     Review of Systems   Constitutional: Positive for fatigue. Negative for appetite change and fever.   HENT: Negative for congestion and rhinorrhea.    Eyes: Negative for discharge and redness.   Respiratory: Negative for cough and shortness of breath.    Cardiovascular: Positive for leg swelling.   Gastrointestinal: Negative for abdominal distention.   Endocrine: Negative for cold intolerance, heat intolerance and polydipsia.   Genitourinary: Negative for flank pain, frequency and hematuria.   Musculoskeletal: Positive for gait problem. Negative for joint swelling.   Skin: Positive for wound. Negative for color change.   Allergic/Immunologic: Negative for immunocompromised state.   Neurological: Positive for weakness. Negative for numbness.   Hematological: Does not bruise/bleed easily.   Psychiatric/Behavioral: Negative for confusion. The patient is not nervous/anxious.      Glucose   Date Value Ref Range Status   09/29/2016 89 70 - 99 MG/DL Final     Hemoglobin   Date Value Ref Range Status   09/29/2016 10.1 (L) 11.8 - 15.7 G/DL Final     Creatinine   Date Value Ref Range Status   09/29/2016 0.5 (L) 0.6 - 1.1 MG/DL Final   ]    Objective   Visit Vitals  Pulse 78   Temp 36.1 °C (97 °F)   Resp 18       Physical Exam  Vitals and nursing note reviewed.   Constitutional:       General: She is not in acute distress.     Appearance: Normal appearance.   HENT:      Head: Normocephalic and atraumatic.   Eyes:      Conjunctiva/sclera: Conjunctivae normal.      Pupils:  Pupils are equal, round, and reactive to light.   Neck:      Trachea: Trachea normal.   Cardiovascular:      Rate and Rhythm: Regular rhythm.   Pulmonary:      Effort: Pulmonary effort is normal. No respiratory distress.      Breath sounds: No wheezing.   Abdominal:      Palpations: Abdomen is soft.      Tenderness: There is no guarding.   Musculoskeletal:         General: Swelling present. No deformity.      Cervical back: Normal range of motion. No edema or erythema.      Right lower leg: Edema present.      Left lower leg: Edema present.        Legs:    Skin:     General: Skin is warm.      Capillary Refill: Capillary refill takes less than 2 seconds.   Neurological:      Mental Status: She is alert and oriented to person, place, and time. Mental status is at baseline.   Psychiatric:         Attention and Perception: She is attentive.         Mood and Affect: Affect is not inappropriate.         Speech: Speech normal.         Behavior: Behavior normal. Behavior is cooperative.                     During visit patient received cleansing with gauze and saline to all wounds to assist in removing bioburden and loosely adhered slough    Assessment/Plan    Diagnosis Plan   1. Venous ulcer of right leg (CMS/HCC)     2. Venous ulcer of left leg (CMS/HCC)     3. Chronic venous insufficiency     4. Edema of lower extremity     5. Xerosis of skin     6. Lymphedema       Problem List Items Addressed This Visit     Lymphedema     PLAN:  Patient is instructed to apply her intermittent compression device 3x/day for a minimum total time of 3 hours.    Patient has received comprehensive instructions and education regarding the management of lymphedema.  He understands that lifestyle changes are essential in maintaining satisfactory outcomes.  These alterations in lifestyle, include: exercise, leg elevation, fluid restrictions, taking diuretics as prescribed, and wearing some form of compression garment.  Failure to comply will  ultimately lead to worsening of lymphedema and subsequent recurrence of cellulitis and ulcers.    Patient may be a candidate for outpatient physical therapy including manual lymphatic drainage, once we decongest extremities using her pneumatic compression pump and Unna boots             Edema of lower extremity     Patient is placed in bilateral Unna boots.  RN applied bilateral Unna boots with triamcinolone as an antipruritic.  Wounds will be changed on a weekly basis in an effort to decongest the extremities    PLAN:    Eventually we may get her Velcro garments such as Farrow wraps or CircAids    Maintain leg elevation above level of the heart as much as possible.  Restrict fluid intake to < 66oz/24 hours  Restrict salt intake to no more than 8275-9086 mgs/sodium/day  Minimize prolonged sitting and standing  Remain active to point of physical tolerance           Xerosis of skin     PLAN:  Eucerin Original Healing Cream to dry skin of legs daily.     It is recommended that you clean the wound using running warm water, a mild bath soap, and a soft wash cloth or bath sponge. Proper cleansing of your wound is essential for wound healing to take place. Inadequate cleansing leads to the build up of yellow or green material in the wound bed, providing overgrowth of bacteria and noxious bacterial by products.           Chronic venous insufficiency     PLAN:  Patient is instructed to apply her intermittent compression device 3x/day for a minimum total time of 3 hours.  Patient has received comprehensive instructions and education regarding the management of chronic venous insufficiency.  He/she understands that lifestyle changes are essential in maintaining satisfactory outcomes.  These alterations in lifestyle, include: exercise, leg elevation, fluid restrictions, taking diuretics as prescribed, and wearing some form of compression garment.  Failure to comply will ultimately lead to worsening of chronic venous  insufficiency and subsequent recurrence of cellulitis and ulcers.           Venous ulcer of right leg (CMS/HCC) - Primary     PLAN:  Unna Boot applied RLE by RN     Patient is given specific instructions regarding care of her Unna Boot.  It is essential that the patient keep legs elevated as much as possible, but short distance ambulation is encouraged and does increase efficacy of Unna Boot compression therapy.  The Unna Boot must be kept dry.    Wound center will order for patient bilateral Farrow wrap 4000 compression garments           Venous ulcer of left leg (CMS/HCC)     PLAN:  Unna Boot applied LLE by RN     Patient is given specific instructions regarding care of her Unna Boot.  It is essential that the patient keep legs elevated as much as possible, but short distance ambulation is encouraged and does increase efficacy of Unna Boot compression therapy.  The Unna Boot must be kept dry.             This document was generated using speech recognition software. Please excuse any typographical errors.    Return to wound center in 1 week     RAMONE Harden MD

## 2022-09-30 NOTE — ASSESSMENT & PLAN NOTE
Patient is placed in bilateral Unna boots.  RN applied bilateral Unna boots with triamcinolone as an antipruritic.  Wounds will be changed on a weekly basis in an effort to decongest the extremities    PLAN:    Eventually we may get her Velcro garments such as Farrow wraps or CircAids    Maintain leg elevation above level of the heart as much as possible.  Restrict fluid intake to < 66oz/24 hours  Restrict salt intake to no more than 5017-5137 mgs/sodium/day  Minimize prolonged sitting and standing  Remain active to point of physical tolerance

## 2022-09-30 NOTE — PATIENT INSTRUCTIONS
Wound Healing Center Instructions    MEDICATIONS     Medication Note  Continue present medications as prescribed by the Wound Healing Center or other physicians you see. To avoid any problems keep the Wound Healing Center informed each visit of any medications changes that occur.     WOUND CARE     Clean Wound with:  Keep dressings dry and intact    Treatment 1   Location: Both Legs  Dressing: Keep dressings dry and intact  Dressing Care Frequency: Weekly  Care Provider: Wound Center Staff       COMPRESSION THERAPY     Unna Boot  Both Legs  An UNNA BOOT (compression wrap) has been applied today   Unna Boot is the brown wrap on your leg(s).  Do not remove or unwrap. Keep boot dry at all times; cover with seal-tight device or a cast over if showering.  A wet Unna boot should be removed as soon as possible to prevent infection and any damage to healthy skin.  If numbness, tingling or increased pain develops, notify the Wound Healing Center at [phone].    Compression Pumps   Minutes:  60   Times per Day: 3     Nutrition  Eat a well, balanced diet with adequate protein to support wound healing.   Take a multivitamin every day. Adequate nutrition supports healing and new tissue growth.  All diabetic patients should strive to keep blood sugars within a normal, practical range.   Elevated blood sugars can delay your wound healing.        ACTIVITY     Elevate legs above level of heart   Elevate your legs above the level of your heart for specific time periods during the day on several firm pillows or a foam leg wedge  Use of a recliner chair at home can often help in the appropriate elevation of your legs above the level of your heart  Normal activity then rest and elevation  May walk    MOBILITY     Wheelchair, Walker, and Cane

## 2022-10-06 ENCOUNTER — ANTICOAGULATION VISIT (OUTPATIENT)
Dept: CARDIOLOGY | Facility: CLINIC | Age: 77
End: 2022-10-06
Payer: COMMERCIAL

## 2022-10-06 LAB
INR PPP: 2.3 (ref 0.9–1.2)
PROTHROMBIN TIME: 23 SEC (ref 9.1–12)

## 2022-10-07 ENCOUNTER — OFFICE VISIT (OUTPATIENT)
Dept: WOUND CARE | Facility: HOSPITAL | Age: 77
End: 2022-10-07
Attending: SURGERY
Payer: COMMERCIAL

## 2022-10-07 VITALS — HEART RATE: 72 BPM | TEMPERATURE: 97.2 F | RESPIRATION RATE: 20 BRPM

## 2022-10-07 DIAGNOSIS — I89.0 LYMPHEDEMA: ICD-10-CM

## 2022-10-07 DIAGNOSIS — L85.3 XEROSIS OF SKIN: ICD-10-CM

## 2022-10-07 DIAGNOSIS — I83.029 VENOUS ULCER OF LEFT LEG (CMS/HCC): ICD-10-CM

## 2022-10-07 DIAGNOSIS — I83.019 VENOUS ULCER OF RIGHT LEG (CMS/HCC): Primary | ICD-10-CM

## 2022-10-07 DIAGNOSIS — R60.0 EDEMA OF LOWER EXTREMITY: ICD-10-CM

## 2022-10-07 DIAGNOSIS — L97.929 VENOUS ULCER OF LEFT LEG (CMS/HCC): ICD-10-CM

## 2022-10-07 DIAGNOSIS — L97.919 VENOUS ULCER OF RIGHT LEG (CMS/HCC): Primary | ICD-10-CM

## 2022-10-07 DIAGNOSIS — I87.2 CHRONIC VENOUS INSUFFICIENCY: ICD-10-CM

## 2022-10-07 PROCEDURE — G0463 HOSPITAL OUTPT CLINIC VISIT: HCPCS

## 2022-10-07 PROCEDURE — 99213 OFFICE O/P EST LOW 20 MIN: CPT | Performed by: SURGERY

## 2022-10-07 PROCEDURE — G0463 HOSPITAL OUTPT CLINIC VISIT: HCPCS | Performed by: SURGERY

## 2022-10-07 ASSESSMENT — ENCOUNTER SYMPTOMS
HEMATURIA: 0
CONFUSION: 0
COLOR CHANGE: 0
FATIGUE: 1
BRUISES/BLEEDS EASILY: 0
EYE REDNESS: 0
FEVER: 0
SHORTNESS OF BREATH: 0
NUMBNESS: 0
FREQUENCY: 0
WOUND: 1
JOINT SWELLING: 0
POLYDIPSIA: 0
WEAKNESS: 1
RHINORRHEA: 0
COUGH: 0
APPETITE CHANGE: 0
EYE DISCHARGE: 0
FLANK PAIN: 0
NERVOUS/ANXIOUS: 0
ABDOMINAL DISTENTION: 0

## 2022-10-07 NOTE — PROGRESS NOTES
Patient ID: Kita Vo                            : 1945  MRN: [id number]                                            Visit Date: 10/7/2022  Encounter Provider: RAMONE Harden  Referring Provider: Shahid Santiago, DO [place]      HPI  Kita is a 77 y.o. old female with a chief compliant of Lymphedema   presenting today for evaluation and management of bilateral lower extremity lymphedema.  She has using her pneumatic compression pump system.  She has received bilateral Farrow wrap 4000 garments and brings these into the Wound Center today for demonstration of proper donning technique.    The following have been reviewed and updated as appropriate in this visit:   Tobacco  Allergies  Meds  Problems  Med Hx  Surg Hx  Fam Hx         Past Medical History:  has a past medical history of Chronic atrial fibrillation (CMS/HCC) and Lymphedema.  Past Surgical History:  has a past surgical history that includes Replacement total hip lateral position (Left); Replacement total knee (Left); Total shoulder replacement (Right); Cataract extraction, bilateral; Posterior laminectomy / decompression lumbar spine; Lumbar wound debridement; Gastric bypass; Cosmetic surgery; and Vaginal prolapse repair.  Social History:   Social History     Tobacco Use    Smoking status: Never Smoker    Smokeless tobacco: Never Used   Vaping Use    Vaping Use: Never used   Substance Use Topics    Alcohol use: No    Drug use: Defer     Family History: family history is not on file.  Medications:   Current Outpatient Medications:     ascorbic acid (VITAMIN C) 500 mg tablet, Take 500 mg by mouth daily., Disp: , Rfl:     biotin 1,000 mcg tablet,chewable, Take 1 tablet by mouth daily., Disp: , Rfl:     calcium carbonate-vitamin D3 600 mg-10 mcg (400 unit) per tablet, Take 1 tablet by mouth daily., Disp: , Rfl:     cyanocobalamin (VITAMIN B12) 1,000 mcg tablet, take 1 tablet by oral route  every day, Disp: , Rfl:      hydrochlorothiazide (HYDRODIURIL) 25 mg tablet, Take 25 mg by mouth daily., Disp: , Rfl:     multivit-iron-min-folic acid (CENTRUM) 3,500-18-0.4 unit-mg-mg tablet,chewable, Take 1 tablet by mouth daily., Disp: , Rfl:     warfarin (COUMADIN) 5 mg tablet, TAKE 1 TABLET (5 MG TOTAL) BY MOUTH ONCE DAILY. TAKE AS DIRECTED, Disp: 90 tablet, Rfl: 1    Allergies: is allergic to acetaminophen, aspirin, celecoxib, nsaids (non-steroidal anti-inflammatory drug), oxycodone, rofecoxib, and tramadol.     Review of Systems   Constitutional: Positive for fatigue. Negative for appetite change and fever.   HENT: Negative for congestion and rhinorrhea.    Eyes: Negative for discharge and redness.   Respiratory: Negative for cough and shortness of breath.    Cardiovascular: Positive for leg swelling.   Gastrointestinal: Negative for abdominal distention.   Endocrine: Negative for cold intolerance, heat intolerance and polydipsia.   Genitourinary: Negative for flank pain, frequency and hematuria.   Musculoskeletal: Positive for gait problem. Negative for joint swelling.   Skin: Positive for wound. Negative for color change.   Allergic/Immunologic: Negative for immunocompromised state.   Neurological: Positive for weakness. Negative for numbness.   Hematological: Does not bruise/bleed easily.   Psychiatric/Behavioral: Negative for confusion. The patient is not nervous/anxious.      Glucose   Date Value Ref Range Status   09/29/2016 89 70 - 99 MG/DL Final     Hemoglobin   Date Value Ref Range Status   09/29/2016 10.1 (L) 11.8 - 15.7 G/DL Final     Creatinine   Date Value Ref Range Status   09/29/2016 0.5 (L) 0.6 - 1.1 MG/DL Final   ]    Objective   Visit Vitals  Pulse 72   Temp 36.2 °C (97.2 °F)   Resp 20       Physical Exam  Vitals and nursing note reviewed.   Constitutional:       General: She is not in acute distress.     Appearance: Normal appearance.   HENT:      Head: Normocephalic and atraumatic.   Eyes:      Conjunctiva/sclera:  Conjunctivae normal.      Pupils: Pupils are equal, round, and reactive to light.   Neck:      Trachea: Trachea normal.   Cardiovascular:      Rate and Rhythm: Regular rhythm.   Pulmonary:      Effort: Pulmonary effort is normal. No respiratory distress.      Breath sounds: No wheezing.   Abdominal:      Palpations: Abdomen is soft.      Tenderness: There is no guarding.   Musculoskeletal:         General: Swelling present. No deformity.      Cervical back: Normal range of motion. No edema or erythema.      Right lower leg: Edema present.      Left lower leg: Edema present.        Legs:    Skin:     General: Skin is warm.      Capillary Refill: Capillary refill takes less than 2 seconds.   Neurological:      Mental Status: She is alert and oriented to person, place, and time. Mental status is at baseline.   Psychiatric:         Attention and Perception: She is attentive.         Mood and Affect: Affect is not inappropriate.         Speech: Speech normal.         Behavior: Behavior normal. Behavior is cooperative.                         During visit patient received cleansing with gauze and saline to all wounds to assist in removing bioburden and loosely adhered slough    Assessment/Plan    Diagnosis Plan   1. Venous ulcer of right leg (CMS/HCC)     2. Venous ulcer of left leg (CMS/HCC)     3. Chronic venous insufficiency     4. Xerosis of skin     5. Edema of lower extremity     6. Lymphedema       Problem List Items Addressed This Visit     Lymphedema     PLAN:  Patient is instructed to apply her intermittent compression device 3x/day for a minimum total time of 3 hours.    Patient has received comprehensive instructions and education regarding the management of lymphedema.  He understands that lifestyle changes are essential in maintaining satisfactory outcomes.  These alterations in lifestyle, include: exercise, leg elevation, fluid restrictions, taking diuretics as prescribed, and wearing some form of  compression garment.  Failure to comply will ultimately lead to worsening of lymphedema and subsequent recurrence of cellulitis and ulcers.    Patient may be a candidate for outpatient physical therapy including manual lymphatic drainage, once we decongest extremities using her pneumatic compression pump and Unna boots             Edema of lower extremity     Patient is placed in bilateral Unna boots.  RN applied bilateral Unna boots with triamcinolone as an antipruritic.  Wounds will be changed on a weekly basis in an effort to decongest the extremities    PLAN:    Eventually we may get her Velcro garments such as Farrow wraps or CircAids    Maintain leg elevation above level of the heart as much as possible.  Restrict fluid intake to < 66oz/24 hours  Restrict salt intake to no more than 5034-9916 mgs/sodium/day  Minimize prolonged sitting and standing  Remain active to point of physical tolerance           Xerosis of skin     PLAN:  Eucerin Original Healing Cream to dry skin of legs daily.     It is recommended that you clean the wound using running warm water, a mild bath soap, and a soft wash cloth or bath sponge. Proper cleansing of your wound is essential for wound healing to take place. Inadequate cleansing leads to the build up of yellow or green material in the wound bed, providing overgrowth of bacteria and noxious bacterial by products.           Chronic venous insufficiency     PLAN:  Patient is instructed to apply her intermittent compression device 3x/day for a minimum total time of 3 hours.  Patient has received comprehensive instructions and education regarding the management of chronic venous insufficiency.  He/she understands that lifestyle changes are essential in maintaining satisfactory outcomes.  These alterations in lifestyle, include: exercise, leg elevation, fluid restrictions, taking diuretics as prescribed, and wearing some form of compression garment.  Failure to comply will ultimately  lead to worsening of chronic venous insufficiency and subsequent recurrence of cellulitis and ulcers.           Venous ulcer of right leg (CMS/HCC) - Primary     No need for Unna boot application at this point in time.    Patient will now start wearing Farrow wrap 4000 daily.  Her  is shown the proper donning technique    Will treat patient with conservative measures in minimizing excessive edema of the lower extremities.  These measures include, and forced leg elevation, physical activity to the point of tolerance, routine hygiene and skin care, and use of some form of compression garment, such as a Tubigrip or prescribed knee-high compression stockings.    Patient will alert the wound center if she notes increase edema, or any recurrence of ulceration or point of drainage.           Venous ulcer of left leg (CMS/HCC)     No need for Unna boot application at this point in time.    Patient will now start wearing Farrow wrap 4000 daily.  Her  is shown the proper donning technique    Will treat patient with conservative measures in minimizing excessive edema of the lower extremities.  These measures include, and forced leg elevation, physical activity to the point of tolerance, routine hygiene and skin care, and use of some form of compression garment, such as a Tubigrip or prescribed knee-high compression stockings.    Patient will alert the wound center if she notes increase edema, or any recurrence of ulceration or point of drainage.             This document was generated using speech recognition software. Please excuse any typographical errors.    Return to wound center in 3 weeks     RAMONE Cuellar. MD [place]

## 2022-10-07 NOTE — ASSESSMENT & PLAN NOTE
No need for Unna boot application at this point in time.    Patient will now start wearing Farrow wrap 4000 daily.  Her  is shown the proper donning technique    Will treat patient with conservative measures in minimizing excessive edema of the lower extremities.  These measures include, and forced leg elevation, physical activity to the point of tolerance, routine hygiene and skin care, and use of some form of compression garment, such as a Tubigrip or prescribed knee-high compression stockings.    Patient will alert the wound center if she notes increase edema, or any recurrence of ulceration or point of drainage.

## 2022-10-07 NOTE — PATIENT INSTRUCTIONS
COMPRESSION THERAPY     Compression Wraps   Wear your farrow wraps daily   Both Legs  A compression wrap has been applied today.  Compression wrap is he brown wrap on your leg(s).  Do not remove or unwrap. Keep boot dry at all times; cover with seal-tight device or a cast cover if showering.   A wet compression wrap should be removed as soon as possible to prevent infection and any damage to healthy skin.   If numbness, tingling or increased pain develops, notify the [facility]aling [facility] at [phone].    Use your pumps EVERYDAY 1-2 times a day

## 2022-10-07 NOTE — ASSESSMENT & PLAN NOTE
Patient is placed in bilateral Unna boots.  RN applied bilateral Unna boots with triamcinolone as an antipruritic.  Wounds will be changed on a weekly basis in an effort to decongest the extremities    PLAN:    Eventually we may get her Velcro garments such as Farrow wraps or CircAids    Maintain leg elevation above level of the heart as much as possible.  Restrict fluid intake to < 66oz/24 hours  Restrict salt intake to no more than 9297-4648 mgs/sodium/day  Minimize prolonged sitting and standing  Remain active to point of physical tolerance

## 2022-11-02 ENCOUNTER — APPOINTMENT (RX ONLY)
Dept: URBAN - METROPOLITAN AREA CLINIC 28 | Facility: CLINIC | Age: 77
Setting detail: DERMATOLOGY
End: 2022-11-02

## 2022-11-02 DIAGNOSIS — L81.4 OTHER MELANIN HYPERPIGMENTATION: ICD-10-CM

## 2022-11-02 DIAGNOSIS — Z71.89 OTHER SPECIFIED COUNSELING: ICD-10-CM

## 2022-11-02 DIAGNOSIS — L82.0 INFLAMED SEBORRHEIC KERATOSIS: ICD-10-CM

## 2022-11-02 DIAGNOSIS — D18.0 HEMANGIOMA: ICD-10-CM

## 2022-11-02 DIAGNOSIS — D22 MELANOCYTIC NEVI: ICD-10-CM

## 2022-11-02 DIAGNOSIS — L85.3 XEROSIS CUTIS: ICD-10-CM

## 2022-11-02 PROBLEM — D22.71 MELANOCYTIC NEVI OF RIGHT LOWER LIMB, INCLUDING HIP: Status: ACTIVE | Noted: 2022-11-02

## 2022-11-02 PROBLEM — D18.01 HEMANGIOMA OF SKIN AND SUBCUTANEOUS TISSUE: Status: ACTIVE | Noted: 2022-11-02

## 2022-11-02 PROBLEM — D22.62 MELANOCYTIC NEVI OF LEFT UPPER LIMB, INCLUDING SHOULDER: Status: ACTIVE | Noted: 2022-11-02

## 2022-11-02 PROBLEM — D22.72 MELANOCYTIC NEVI OF LEFT LOWER LIMB, INCLUDING HIP: Status: ACTIVE | Noted: 2022-11-02

## 2022-11-02 PROBLEM — D22.5 MELANOCYTIC NEVI OF TRUNK: Status: ACTIVE | Noted: 2022-11-02

## 2022-11-02 PROBLEM — D22.61 MELANOCYTIC NEVI OF RIGHT UPPER LIMB, INCLUDING SHOULDER: Status: ACTIVE | Noted: 2022-11-02

## 2022-11-02 PROCEDURE — ? PRESCRIPTION MEDICATION MANAGEMENT

## 2022-11-02 PROCEDURE — 99213 OFFICE O/P EST LOW 20 MIN: CPT | Mod: 25

## 2022-11-02 PROCEDURE — ? SHAVE REMOVAL

## 2022-11-02 PROCEDURE — ? PRESCRIPTION

## 2022-11-02 PROCEDURE — ? COUNSELING

## 2022-11-02 PROCEDURE — 11306 SHAVE SKIN LESION 0.6-1.0 CM: CPT

## 2022-11-02 PROCEDURE — ? SUNSCREEN RECOMMENDATIONS

## 2022-11-02 PROCEDURE — ? FULL BODY SKIN EXAM

## 2022-11-02 RX ORDER — AMMONIUM LACTATE 12 G/100G
LOTION TOPICAL QDAY
Qty: 400 | Refills: 3 | Status: ERX | COMMUNITY
Start: 2022-11-02

## 2022-11-02 RX ADMIN — AMMONIUM LACTATE: 12 LOTION TOPICAL at 00:00

## 2022-11-02 ASSESSMENT — LOCATION ZONE DERM
LOCATION ZONE: LEG
LOCATION ZONE: NECK
LOCATION ZONE: TRUNK
LOCATION ZONE: ARM

## 2022-11-02 ASSESSMENT — LOCATION SIMPLE DESCRIPTION DERM
LOCATION SIMPLE: RIGHT POSTERIOR UPPER ARM
LOCATION SIMPLE: RIGHT UPPER BACK
LOCATION SIMPLE: LEFT ANTERIOR NECK
LOCATION SIMPLE: ABDOMEN
LOCATION SIMPLE: LEFT POSTERIOR UPPER ARM
LOCATION SIMPLE: UPPER BACK
LOCATION SIMPLE: RIGHT LOWER BACK
LOCATION SIMPLE: LEFT PRETIBIAL REGION
LOCATION SIMPLE: RIGHT PRETIBIAL REGION

## 2022-11-02 ASSESSMENT — LOCATION DETAILED DESCRIPTION DERM
LOCATION DETAILED: LEFT PROXIMAL POSTERIOR UPPER ARM
LOCATION DETAILED: EPIGASTRIC SKIN
LOCATION DETAILED: SUPERIOR THORACIC SPINE
LOCATION DETAILED: RIGHT SUPERIOR MEDIAL UPPER BACK
LOCATION DETAILED: LEFT PROXIMAL PRETIBIAL REGION
LOCATION DETAILED: RIGHT SUPERIOR MEDIAL LOWER BACK
LOCATION DETAILED: RIGHT PROXIMAL POSTERIOR UPPER ARM
LOCATION DETAILED: RIGHT PROXIMAL PRETIBIAL REGION
LOCATION DETAILED: LEFT INFERIOR ANTERIOR NECK

## 2022-11-02 NOTE — PROCEDURE: PRESCRIPTION MEDICATION MANAGEMENT
Detail Level: Generalized
Render In Strict Bullet Format?: No
Initiate Treatment: ammonium lactate 12 % lotion: Apply a thin layer to the body qday after shower

## 2022-11-02 NOTE — PROCEDURE: SHAVE REMOVAL
Medical Necessity Information: It is in your best interest to select a reason for this procedure from the list below. All of these items fulfill various CMS LCD requirements except the new and changing color options.
Medical Necessity Clause: This procedure was medically necessary because the lesion that was treated was:
Lab: -[id number]
Lab Facility: 0
Detail Level: Detailed
Was A Bandage Applied: Yes
Size Of Lesion In Cm (Required): 0.8
Biopsy Method: Dermablade
Anesthesia Type: 1% lidocaine with epinephrine
Hemostasis: Aluminum Chloride and Electrocautery
Wound Care: Vaseline
Render Path Notes In Note?: No
Consent was obtained from the patient. The risks and benefits to therapy were discussed in detail. Specifically, the risks of infection, scarring, bleeding, prolonged wound healing, incomplete removal, allergy to anesthesia, nerve injury and recurrence were addressed. Prior to the procedure, the treatment site was clearly identified and confirmed by the patient. All components of Universal Protocol/PAUSE Rule completed.
Post-Care Instructions: I reviewed with the patient in detail post-care instructions. Patient is to keep the biopsy site dry overnight, and then apply bacitracin twice daily until healed. Patient may apply hydrogen peroxide soaks to remove any crusting.
Notification Instructions: Patient will be notified of pathology results. However, patient instructed to call the office if not contacted within 2 weeks.
Billing Type: Third-Party Bill

## 2022-11-10 ENCOUNTER — ANTICOAGULATION VISIT (OUTPATIENT)
Dept: CARDIOLOGY | Facility: CLINIC | Age: 77
End: 2022-11-10
Payer: COMMERCIAL

## 2022-11-10 LAB
INR PPP: 2.4 (ref 0.9–1.2)
PROTHROMBIN TIME: 24 SEC (ref 9.1–12)

## 2022-11-17 ENCOUNTER — OFFICE VISIT (OUTPATIENT)
Dept: WOUND CARE | Facility: HOSPITAL | Age: 77
End: 2022-11-17
Attending: SURGERY
Payer: COMMERCIAL

## 2022-11-17 DIAGNOSIS — L85.3 XEROSIS OF SKIN: ICD-10-CM

## 2022-11-17 DIAGNOSIS — R60.0 EDEMA OF LOWER EXTREMITY: ICD-10-CM

## 2022-11-17 DIAGNOSIS — I89.0 LYMPHEDEMA: Primary | ICD-10-CM

## 2022-11-17 PROCEDURE — 2W1QX6Z COMPRESSION OF RIGHT LOWER LEG USING PRESSURE DRESSING: ICD-10-PCS | Performed by: SURGERY

## 2022-11-17 PROCEDURE — 2W1RX6Z COMPRESSION OF LEFT LOWER LEG USING PRESSURE DRESSING: ICD-10-PCS | Performed by: SURGERY

## 2022-11-17 PROCEDURE — 29580 STRAPPING UNNA BOOT: CPT | Mod: 50

## 2022-11-17 PROCEDURE — 27200104 HC MEDICOPASTE UNNA BOOT

## 2022-11-17 PROCEDURE — 99213 OFFICE O/P EST LOW 20 MIN: CPT | Performed by: SURGERY

## 2022-11-17 PROCEDURE — 29580 STRAPPING UNNA BOOT: CPT | Mod: 50 | Performed by: SURGERY

## 2022-11-17 ASSESSMENT — ENCOUNTER SYMPTOMS
ABDOMINAL DISTENTION: 0
FEVER: 0
FATIGUE: 1
EYE DISCHARGE: 0
POLYDIPSIA: 0
FLANK PAIN: 0
APPETITE CHANGE: 0
WOUND: 1
CONFUSION: 0
NERVOUS/ANXIOUS: 0
WEAKNESS: 1
NUMBNESS: 0
SHORTNESS OF BREATH: 0
RHINORRHEA: 0
FREQUENCY: 0
EYE REDNESS: 0
HEMATURIA: 0
COUGH: 0
JOINT SWELLING: 0
COLOR CHANGE: 0
BRUISES/BLEEDS EASILY: 0

## 2022-11-17 NOTE — PROGRESS NOTES
Patient ID: Kita Vo                            : 1945  MRN: [id number]                                            Visit Date: 2022  Encounter Provider: RAMONE Harden  Referring Provider: Shahid Santiago, DO [place]      HPI  Kita is a 77 y.o. old female with a chief compliant of Wound Check and Lymphedema   presenting today for evaluation and management of bilateral lower extremity lymphedema.  For the last month the patient has been using her pneumatic compression pump system in addition to wearing bilateral Farrow wrap 4000 garments.  The underlying nerves are rolling down and the patient is very uncomfortable.  She really likes the Unna boots, but does not want to have to come weekly because it costs her $35 each visit    The following have been reviewed and updated as appropriate in this visit:   Tobacco  Allergies  Meds  Problems  Med Hx  Surg Hx  Fam Hx       Past Medical History:  has a past medical history of Chronic atrial fibrillation (CMS/HCC) and Lymphedema.  Past Surgical History:  has a past surgical history that includes Replacement total hip lateral position (Left); Replacement total knee (Left); Total shoulder replacement (Right); Cataract extraction, bilateral; Posterior laminectomy / decompression lumbar spine; Lumbar wound debridement; Gastric bypass; Cosmetic surgery; and Vaginal prolapse repair.  Social History:   Social History     Tobacco Use    Smoking status: Never    Smokeless tobacco: Never   Vaping Use    Vaping Use: Never used   Substance Use Topics    Alcohol use: No    Drug use: Defer     Family History: family history is not on file.  Medications:   Current Outpatient Medications:     ascorbic acid (VITAMIN C) 500 mg tablet, Take 500 mg by mouth daily., Disp: , Rfl:     biotin 1,000 mcg tablet,chewable, Take 1 tablet by mouth daily., Disp: , Rfl:     calcium carbonate-vitamin D3 600 mg-10 mcg (400 unit) per tablet, Take 1 tablet by  mouth daily., Disp: , Rfl:     cyanocobalamin (VITAMIN B12) 1,000 mcg tablet, take 1 tablet by oral route  every day, Disp: , Rfl:     hydrochlorothiazide (HYDRODIURIL) 25 mg tablet, Take 25 mg by mouth daily., Disp: , Rfl:     multivit-iron-min-folic acid (CENTRUM) 3,500-18-0.4 unit-mg-mg tablet,chewable, Take 1 tablet by mouth daily., Disp: , Rfl:     warfarin (COUMADIN) 5 mg tablet, TAKE 1 TABLET (5 MG TOTAL) BY MOUTH ONCE DAILY. TAKE AS DIRECTED, Disp: 90 tablet, Rfl: 1    Allergies: is allergic to acetaminophen, aspirin, celecoxib, nsaids (non-steroidal anti-inflammatory drug), oxycodone, rofecoxib, and tramadol.     Review of Systems   Constitutional: Positive for fatigue. Negative for appetite change and fever.   HENT: Negative for congestion and rhinorrhea.    Eyes: Negative for discharge and redness.   Respiratory: Negative for cough and shortness of breath.    Cardiovascular: Positive for leg swelling.   Gastrointestinal: Negative for abdominal distention.   Endocrine: Negative for cold intolerance, heat intolerance and polydipsia.   Genitourinary: Negative for flank pain, frequency and hematuria.   Musculoskeletal: Positive for gait problem. Negative for joint swelling.   Skin: Positive for wound. Negative for color change.   Allergic/Immunologic: Negative for immunocompromised state.   Neurological: Positive for weakness. Negative for numbness.   Hematological: Does not bruise/bleed easily.   Psychiatric/Behavioral: Negative for confusion. The patient is not nervous/anxious.      Glucose   Date Value Ref Range Status   09/29/2016 89 70 - 99 MG/DL Final     Hemoglobin   Date Value Ref Range Status   09/29/2016 10.1 (L) 11.8 - 15.7 G/DL Final     Creatinine   Date Value Ref Range Status   09/29/2016 0.5 (L) 0.6 - 1.1 MG/DL Final   ]    Objective   Visit Vitals  Pulse 74   Temp 36.8 °C (98.2 °F)   Resp 18       Physical Exam  Vitals and nursing note reviewed.   Constitutional:       General: She is not  in acute distress.     Appearance: Normal appearance.   HENT:      Head: Normocephalic and atraumatic.   Eyes:      Conjunctiva/sclera: Conjunctivae normal.      Pupils: Pupils are equal, round, and reactive to light.   Neck:      Trachea: Trachea normal.   Cardiovascular:      Rate and Rhythm: Regular rhythm.      Pulses: Intact distal pulses.   Pulmonary:      Effort: Pulmonary effort is normal. No respiratory distress.      Breath sounds: No wheezing.   Abdominal:      Palpations: Abdomen is soft.      Tenderness: There is no guarding.   Musculoskeletal:         General: Swelling present. No deformity.      Cervical back: Normal range of motion. No edema or erythema.      Right lower leg: Edema present.      Left lower leg: Edema present.        Legs:    Skin:     General: Skin is warm.      Capillary Refill: Capillary refill takes less than 2 seconds.   Neurological:      Mental Status: She is alert and oriented to person, place, and time.      Motor: Weakness present.      Coordination: Coordination abnormal.      Gait: Gait abnormal.   Psychiatric:         Attention and Perception: She is attentive.         Mood and Affect: Affect is not inappropriate.         Speech: Speech normal.         Behavior: Behavior normal. Behavior is cooperative.         Cognition and Memory: Cognition and memory normal.                   During visit patient received cleansing with gauze and saline to all wounds to assist in removing bioburden and loosely adhered slough    Assessment/Plan    Diagnosis Plan   1. Lymphedema        2. Xerosis of skin        3. Edema of lower extremity          Problem List Items Addressed This Visit     Lymphedema - Primary     PLAN:  Patient is instructed to apply her intermittent compression device 3x/day for a minimum total time of 3 hours.    Patient has received comprehensive instructions and education regarding the management of lymphedema.  He understands that lifestyle changes are essential  in maintaining satisfactory outcomes.  These alterations in lifestyle, include: exercise, leg elevation, fluid restrictions, taking diuretics as prescribed, and wearing some form of compression garment.  Failure to comply will ultimately lead to worsening of lymphedema and subsequent recurrence of cellulitis and ulcers.    Patient may be a candidate for outpatient physical therapy including manual lymphatic drainage, once we decongest extremities using her pneumatic compression pump and Unna boots           Edema of lower extremity     PLAN:    Patient is wearing bilateral Farrow wrap 4000.  Use Tubigrip G as an under liner.    Bilateral Unna boots applied by RN.  Patient will keep the Unna boots on for 7 to 14 days, then remove the Unna boots herself.  Transition back into Farrow wrap 4000 and using Tubigrip G under liners.    Maintain leg elevation above level of the heart as much as possible.  Restrict fluid intake to < 66oz/24 hours  Restrict salt intake to no more than 5813-5992 mgs/sodium/day  Minimize prolonged sitting and standing  Remain active to point of physical tolerance         Xerosis of skin     PLAN:  Eucerin Original Healing Cream to dry skin of legs daily.     It is recommended that you clean the wound using running warm water, a mild bath soap, and a soft wash cloth or bath sponge. Proper cleansing of your wound is essential for wound healing to take place. Inadequate cleansing leads to the build up of yellow or green material in the wound bed, providing overgrowth of bacteria and noxious bacterial by products.        This document was generated using speech recognition software. Please excuse any typographical errors.    Return to wound center in 6 to 8 weeks     RAMONE Harden MD

## 2022-11-17 NOTE — PATIENT INSTRUCTIONS
COMPRESSION THERAPY     Unna Boot  Both Legs  An UNNA BOOT (compression wrap) has been applied today   Unna Boot is the brown wrap on your leg(s).  Do not remove or unwrap. Keep boot dry at all times; cover with seal-tight device or a cast over if showering.  A wet Unna boot should be removed as soon as possible to prevent infection and any damage to healthy skin.  If numbness, tingling or increased pain develops, notify the [facility]aling Center at [phone].    AFTER 10-14 DAYS OF THE UNNAS BOOT, YOU MAY REMOVE BY UNWRAPPING THE BOOTS.  ONCE OFF, SHOWER AND THEN WEAR YOUR COMPRESSION WRAPS.  FIRST LAYER WILL BE THE TUBIGRIP, THEN THE VELCRO WRAP    COMPRESSION THERAPY     Tubigrip Stockings  Both Legs, SIZE G  Apply Tubigrips (cotton/compression stockinette) DAILY.  Apply stockings upon arising in the morning to obtain the best results.  Remove stockings at bedtime once your legs are elevated.  Do not allow the stockinette to roll down as it can reduce or interrupt the blood flow in your limb.  Always wear the appropriate size that is recommended for you at the [facility].  Tubigrips can be washed by hand with soap and water and hang to dry overnight.     MEDICATIONS     Medication Note  Continue present medications as prescribed by the [facility]aling Center or other physicians you see. To avoid any problems keep the [facility]aling Center informed each visit of any medications changes that occur.     WOUND CARE         Basic Principles      Wash your hands thoroughly with soap and water and after each dressing change. If someone other than the patient changes the dressing, its best to wear disposable gloves.   Do not get the wound or dressing wet.   To shower: remove the dressing, shower with soap and water (including washing the wound - do not use a washcloth), air dry, then redress the wound.  Do not take a tub bath  Keep all your dressings in a clean, covered container at home to avoid dust and  contamination.  Discard used dressings in a plastic bag or covered trash container.  Check your wound and the surrounding skin at each dressing change for redness, warmth, swelling, increased pain, foul odor, fever, pus or abnormal drainage or discharge.  Notify Wound Healing Center if any of these changes occur - [phone].        Nutrition  Eat a well, balanced diet with adequate protein to support wound healing.   Take a multivitamin every day. Adequate nutrition supports healing and new tissue growth.  All diabetic patients should strive to keep blood sugars within a normal, practical range.   Elevated blood sugars can delay your wound healing.        ACTIVITY     Elevate legs above level of heart   Elevate your legs above the level of your heart for specific time periods during the day on several firm pillows or a foam leg wedge  Use of a recliner chair at home can often help in the appropriate elevation of your legs above the level of your heart  Normal activity then rest and elevation  May shower  May excercise  May walk    MOBILITY     Wheelchair

## 2022-11-17 NOTE — ASSESSMENT & PLAN NOTE
PLAN:    Patient is wearing bilateral Farrow wrap 4000.  Use Tubigrip G as an under liner.    Bilateral Unna boots applied by RN.  Patient will keep the Unna boots on for 7 to 14 days, then remove the Unna boots herself.  Transition back into Farrow wrap 4000 and using Tubigrip G under liners.    Maintain leg elevation above level of the heart as much as possible.  Restrict fluid intake to < 66oz/24 hours  Restrict salt intake to no more than 4917-6422 mgs/sodium/day  Minimize prolonged sitting and standing  Remain active to point of physical tolerance

## 2022-12-08 ENCOUNTER — ANTICOAGULATION VISIT (OUTPATIENT)
Dept: CARDIOLOGY | Facility: CLINIC | Age: 77
End: 2022-12-08
Payer: COMMERCIAL

## 2022-12-08 LAB
INR PPP: 2.5 (ref 0.9–1.2)
PROTHROMBIN TIME: 24.6 SEC (ref 9.1–12)

## 2022-12-30 RX ORDER — WARFARIN SODIUM 5 MG/1
TABLET ORAL
Qty: 90 TABLET | Refills: 1 | Status: SHIPPED | OUTPATIENT
Start: 2022-12-30 | End: 2023-07-19

## 2023-01-05 ENCOUNTER — ANTICOAGULATION VISIT (OUTPATIENT)
Dept: CARDIOLOGY | Facility: CLINIC | Age: 78
End: 2023-01-05
Payer: COMMERCIAL

## 2023-01-05 LAB
INR PPP: 2.8 (ref 0.9–1.2)
PROTHROMBIN TIME: 27.8 SEC (ref 9.1–12)

## 2023-02-02 ENCOUNTER — ANTICOAGULATION VISIT (OUTPATIENT)
Dept: CARDIOLOGY | Facility: CLINIC | Age: 78
End: 2023-02-02
Payer: COMMERCIAL

## 2023-02-02 LAB
INR PPP: 2 (ref 0.9–1.2)
PROTHROMBIN TIME: 19.5 SEC (ref 9.1–12)
SPECIMEN STATUS: NORMAL

## 2023-02-16 ENCOUNTER — TELEPHONE (OUTPATIENT)
Dept: SCHEDULING | Facility: CLINIC | Age: 78
End: 2023-02-16
Payer: COMMERCIAL

## 2023-02-16 NOTE — TELEPHONE ENCOUNTER
I called te - she accidentally took two dose of warfarin last night - 5 mg each.  She is due for 5 mg tonight and I asked her to take 2.5 mg instead.  She will then go back to her regular schedule.

## 2023-02-16 NOTE — TELEPHONE ENCOUNTER
Pt of B. Dr Santiago/[facility] Pt    Pt called in today to report she took an extra 5mg of Warfarin last night. Pt is scheduled 5mg of warfarin on Wed, therefore took a total of 10mg of warfarin last night.   Patient wondering what to do for tonight's dose of Warfarin.    Instructed patient to not take any Coumadin today until she hears from Dr Santiago's office with further instructions.     Patient can be reached at [phone]

## 2023-03-02 ENCOUNTER — ANTICOAGULATION VISIT (OUTPATIENT)
Dept: CARDIOLOGY | Facility: CLINIC | Age: 78
End: 2023-03-02

## 2023-03-02 ENCOUNTER — OFFICE VISIT (OUTPATIENT)
Dept: CARDIOLOGY | Facility: CLINIC | Age: 78
End: 2023-03-02
Payer: COMMERCIAL

## 2023-03-02 ENCOUNTER — TELEPHONE (OUTPATIENT)
Dept: SCHEDULING | Facility: CLINIC | Age: 78
End: 2023-03-02
Payer: COMMERCIAL

## 2023-03-02 ENCOUNTER — DOCUMENTATION (OUTPATIENT)
Dept: CARDIOLOGY | Facility: CLINIC | Age: 78
End: 2023-03-02
Payer: COMMERCIAL

## 2023-03-02 VITALS
WEIGHT: 270 LBS | SYSTOLIC BLOOD PRESSURE: 132 MMHG | BODY MASS INDEX: 40.92 KG/M2 | DIASTOLIC BLOOD PRESSURE: 78 MMHG | HEART RATE: 78 BPM | HEIGHT: 68 IN

## 2023-03-02 DIAGNOSIS — R94.31 ABNORMAL EKG: Primary | ICD-10-CM

## 2023-03-02 LAB
INR PPP: 2.1 (ref 0.9–1.2)
PROTHROMBIN TIME: 20.9 SEC (ref 9.1–12)

## 2023-03-02 PROCEDURE — 99214 OFFICE O/P EST MOD 30 MIN: CPT | Performed by: INTERNAL MEDICINE

## 2023-03-02 PROCEDURE — 93000 ELECTROCARDIOGRAM COMPLETE: CPT | Performed by: INTERNAL MEDICINE

## 2023-03-02 PROCEDURE — 3008F BODY MASS INDEX DOCD: CPT | Performed by: INTERNAL MEDICINE

## 2023-03-02 ASSESSMENT — ENCOUNTER SYMPTOMS
HEMATOLOGIC/LYMPHATIC NEGATIVE: 1
GASTROINTESTINAL NEGATIVE: 1
NEUROLOGICAL NEGATIVE: 1
CONSTITUTIONAL NEGATIVE: 1
EYES NEGATIVE: 1
PSYCHIATRIC NEGATIVE: 1
NECK PAIN: 1
BACK PAIN: 1
MYALGIAS: 1
RESPIRATORY NEGATIVE: 1

## 2023-03-02 NOTE — TELEPHONE ENCOUNTER
Mari. Office Patient seen today 3/2/2023 .  Please advise on correct vitals and if any corrections are needed to chart.

## 2023-03-02 NOTE — LETTER
March 2, 2023     Jerome Daniel Sr., DO  [address] [facility] [address]    Patient: Kita Vo  YOB: 1945  Date of Visit: 3/2/2023      Dear Dr. Daniel:    Thank you for referring Kita Vo to me for evaluation. Below are my notes for this consultation.    If you have questions, please do not hesitate to call me. I look forward to following your patient along with you.         Sincerely,        Shahid Santiago DO        CC: No Recipients  Shahid Santiago DO  3/2/2023  9:04 AM  Signed      Chief Complaint: I saw Kita in the office today on a routine visit for her hypertension and lymphedema.  She has been seen at [facility] wound care center by Dr. Harden and was making good progress but she cannot afford the co-pays at this point.  She does not walk so is hard to assess her cardiovascular status.  She denies chest discomfort but does have some shortness of breath with exertion but she is severely deconditioned.  She denies chest discomfort or shortness of breath with anxiety, cold weather, postprandially, at rest, or nocturnally.  She does not climb stairs, she denies proximal nocturnal dyspnea orthopnea palpitations syncope has lower extremity edema that is not as hard as it was previously but does not go down by the next day and she has nocturia.  Medications:  Current Outpatient Medications   Medication Sig Dispense Refill   • ascorbic acid (VITAMIN C) 500 mg tablet Take 500 mg by mouth daily.     • biotin 1,000 mcg tablet,chewable Take 1 tablet by mouth daily.     • calcium carbonate-vitamin D3 600 mg-10 mcg (400 unit) per tablet Take 1 tablet by mouth daily.     • cyanocobalamin (VITAMIN B12) 1,000 mcg tablet take 1 tablet by oral route  every day     • hydrochlorothiazide (HYDRODIURIL) 25 mg tablet Take 25 mg by mouth daily.     • multivit-iron-min-folic acid (CENTRUM) 3,500-18-0.4 unit-mg-mg tablet,chewable Take 1 tablet by mouth daily.     • warfarin (COUMADIN) 5 mg tablet  TAKE 1 TABLET BY MOUTH ONCE DAILY. TAKE AS DIRECTED 90 tablet 1     No current facility-administered medications for this visit.       Review of Systems:  Review of Systems   Constitutional: Negative.   HENT: Negative.    Eyes: Negative.    Cardiovascular: Positive for leg swelling.   Respiratory: Negative.    Hematologic/Lymphatic: Negative.    Skin: Negative.    Musculoskeletal: Positive for back pain, joint pain, myalgias and neck pain.   Gastrointestinal: Negative.    Genitourinary: Positive for nocturia.   Neurological: Negative.    Psychiatric/Behavioral: Negative.    Allergic/Immunologic: Positive for environmental allergies.       Physical Exam:  Vitals:    03/02/23 0813   BP: 132/78   Pulse: 78     Physical Exam  Constitutional:       Appearance: She is well-developed. She is obese.   HENT:      Head: Normocephalic and atraumatic.   Eyes:      Conjunctiva/sclera: Conjunctivae normal.      Pupils: Pupils are equal, round, and reactive to light.   Cardiovascular:      Rate and Rhythm: Normal rate. Rhythm irregular.      Pulses: Intact distal pulses.      Heart sounds: Normal heart sounds.   Pulmonary:      Effort: Pulmonary effort is normal.      Breath sounds: Normal breath sounds.   Abdominal:      General: Bowel sounds are normal.      Palpations: Abdomen is soft.   Musculoskeletal:         General: Normal range of motion.      Cervical back: Normal range of motion and neck supple.      Right lower leg: Edema present.      Left lower leg: Edema present.   Skin:     General: Skin is warm and dry.   Neurological:      Mental Status: She is alert and oriented to person, place, and time.      Deep Tendon Reflexes: Reflexes are normal and symmetric.   Psychiatric:         Speech: Speech normal.         Behavior: Behavior normal.         Thought Content: Thought content normal.         Judgment: Judgment normal.       EKG:afib controlled ventricular response ST-Tabn    Assessment and  Plan:  Impression:  Lymphedema.  Chronic atrial fibrillation controlled ventricular response with an INR today of 2.1.  Hypertension controlled.  Osteoarthritis.  Obesity.  Recommendation: She appears to be hemodynamically stable there are no signs of heart failure and her lymphedema is certainly not getting worse.  She will see Dr. Roberts in 6 months time, if there is a problem I will see her till the end of June.  Thank you for the opportunity seeing this interesting lady.    Shahid Santiago, DO

## 2023-03-02 NOTE — TELEPHONE ENCOUNTER
"See prior.  Vitals documented in chart, OV note and on AVS 3/1/23.  BP: 132/78   Pulse: 78     I do not see any documentation for \" /70\"     Please advise on correct vitals and if any corrections are needed to chart.  Thank you  "

## 2023-03-02 NOTE — PROGRESS NOTES
Chief Complaint: I saw Kita in the office today on a routine visit for her hypertension and lymphedema.  She has been seen at [facility] wound care center by Dr. Harden and was making good progress but she cannot afford the co-pays at this point.  She does not walk so is hard to assess her cardiovascular status.  She denies chest discomfort but does have some shortness of breath with exertion but she is severely deconditioned.  She denies chest discomfort or shortness of breath with anxiety, cold weather, postprandially, at rest, or nocturnally.  She does not climb stairs, she denies proximal nocturnal dyspnea orthopnea palpitations syncope has lower extremity edema that is not as hard as it was previously but does not go down by the next day and she has nocturia.  Medications:  Current Outpatient Medications   Medication Sig Dispense Refill   • ascorbic acid (VITAMIN C) 500 mg tablet Take 500 mg by mouth daily.     • biotin 1,000 mcg tablet,chewable Take 1 tablet by mouth daily.     • calcium carbonate-vitamin D3 600 mg-10 mcg (400 unit) per tablet Take 1 tablet by mouth daily.     • cyanocobalamin (VITAMIN B12) 1,000 mcg tablet take 1 tablet by oral route  every day     • hydrochlorothiazide (HYDRODIURIL) 25 mg tablet Take 25 mg by mouth daily.     • multivit-iron-min-folic acid (CENTRUM) 3,500-18-0.4 unit-mg-mg tablet,chewable Take 1 tablet by mouth daily.     • warfarin (COUMADIN) 5 mg tablet TAKE 1 TABLET BY MOUTH ONCE DAILY. TAKE AS DIRECTED 90 tablet 1     No current facility-administered medications for this visit.       Review of Systems:  Review of Systems   Constitutional: Negative.   HENT: Negative.    Eyes: Negative.    Cardiovascular: Positive for leg swelling.   Respiratory: Negative.    Hematologic/Lymphatic: Negative.    Skin: Negative.    Musculoskeletal: Positive for back pain, joint pain, myalgias and neck pain.   Gastrointestinal: Negative.    Genitourinary: Positive for nocturia.    Neurological: Negative.    Psychiatric/Behavioral: Negative.    Allergic/Immunologic: Positive for environmental allergies.       Physical Exam:  Vitals:    03/02/23 0813   BP: 132/78   Pulse: 78     Physical Exam  Constitutional:       Appearance: She is well-developed. She is obese.   HENT:      Head: Normocephalic and atraumatic.   Eyes:      Conjunctiva/sclera: Conjunctivae normal.      Pupils: Pupils are equal, round, and reactive to light.   Cardiovascular:      Rate and Rhythm: Normal rate. Rhythm irregular.      Pulses: Intact distal pulses.      Heart sounds: Normal heart sounds.   Pulmonary:      Effort: Pulmonary effort is normal.      Breath sounds: Normal breath sounds.   Abdominal:      General: Bowel sounds are normal.      Palpations: Abdomen is soft.   Musculoskeletal:         General: Normal range of motion.      Cervical back: Normal range of motion and neck supple.      Right lower leg: Edema present.      Left lower leg: Edema present.   Skin:     General: Skin is warm and dry.   Neurological:      Mental Status: She is alert and oriented to person, place, and time.      Deep Tendon Reflexes: Reflexes are normal and symmetric.   Psychiatric:         Speech: Speech normal.         Behavior: Behavior normal.         Thought Content: Thought content normal.         Judgment: Judgment normal.       EKG:afib controlled ventricular response ST-Tabn    Assessment and Plan:  Impression:  Lymphedema.  Chronic atrial fibrillation controlled ventricular response with an INR today of 2.1.  Hypertension controlled.  Osteoarthritis.  Obesity.  Recommendation: She appears to be hemodynamically stable there are no signs of heart failure and her lymphedema is certainly not getting worse.  She will see Dr. Roberts in 6 months time, if there is a problem I will see her till the end of June.  Thank you for the opportunity seeing this interesting lady.    Shahid Santiago DO

## 2023-03-02 NOTE — PROGRESS NOTES
Please note I did not correct the nurses blood pressure, her blood pressure taken by me was 120/70.  It is not 132/76.

## 2023-03-02 NOTE — TELEPHONE ENCOUNTER
From Dr. Santiago .I put a note in the chart as to the corrected blood pressure I just did not put it in the blood pressure section I put it as a separate entry was I did not know how to put it in the blood pressure section after the chart was closed

## 2023-03-02 NOTE — TELEPHONE ENCOUNTER
Thank you  Spoke w/ patient  Advised BK wrote note indicating /70 for today's OV  Advised unable to edit Vital sign section of chart once OV is close/completed.  Patient aware and thanks you for the correction.    Thank you

## 2023-03-02 NOTE — PROGRESS NOTES
Kita in the office - Informed her INR was therapeutic and to continue her current dose of warfrin.

## 2023-03-02 NOTE — TELEPHONE ENCOUNTER
I put a note in the chart as to the corrected blood pressure I just did not put it in the blood pressure section I put it as a separate entry was I did not know how to put it in the blood pressure section after the chart was closed

## 2023-03-02 NOTE — TELEPHONE ENCOUNTER
Pt would like to discuss her BP from yesterday as Dr Santiago told her that her BP was 120/70 but her d/c paperwork states 132/78    Pt can be reached at [phone]

## 2023-03-30 ENCOUNTER — ANTICOAGULATION VISIT (OUTPATIENT)
Dept: CARDIOLOGY | Facility: CLINIC | Age: 78
End: 2023-03-30
Payer: COMMERCIAL

## 2023-03-30 LAB
INR PPP: 2.2 (ref 0.9–1.2)
PROTHROMBIN TIME: 21.6 SEC (ref 9.1–12)

## 2023-03-30 NOTE — PROGRESS NOTES
I spoke to Kita and let her know her INR was therapeutic.  She will continue her current dose of warfarin.

## 2023-04-21 ENCOUNTER — TELEPHONE (OUTPATIENT)
Dept: SCHEDULING | Facility: CLINIC | Age: 78
End: 2023-04-21
Payer: COMMERCIAL

## 2023-04-21 NOTE — TELEPHONE ENCOUNTER
Please see prior.  Spoke with patient.  States dentist  said she did not need to HOLD coumadin.  Asked her to call back and ask if dentist wants HOLD.  Informed if they do not require HOLD on Coumadin then continue.  I asked she call us back to let us know outcome.  Is this acceptable?

## 2023-04-21 NOTE — TELEPHONE ENCOUNTER
Pt called said she is getting a root canal done on Monday pt wants to know if she should stop warfarin for about a day or so prior     P: [phone]

## 2023-04-27 ENCOUNTER — ANTICOAGULATION VISIT (OUTPATIENT)
Dept: CARDIOLOGY | Facility: CLINIC | Age: 78
End: 2023-04-27
Payer: COMMERCIAL

## 2023-04-27 LAB
INR PPP: 2 (ref 0.9–1.2)
PROTHROMBIN TIME: 20.1 SEC (ref 9.1–12)

## 2023-04-27 NOTE — PROGRESS NOTES
I spoke to Kita to let her know her INR was therapeutic.  She will continue her current dose of warfarin.

## 2023-05-22 ENCOUNTER — TELEPHONE (OUTPATIENT)
Dept: CARDIOLOGY | Facility: CLINIC | Age: 78
End: 2023-05-22
Payer: COMMERCIAL

## 2023-05-22 NOTE — TELEPHONE ENCOUNTER
Dr. Santiago patient-sees pt at  [facility].    Mrs. Vo is anticoagulated with Warfarin for atrial fibrillation with target range 2.0-3.0.    Pt contacted the office to ask for your thoughts about beginning GoLo as she needs to lose weight. She reports this is a plant based weight loss medication. She wanted to make sure it'd be okay to take with her Warfarin.    She is anxious for a return call and can be reached at [phone].    She is scheduled for an INR by home draw Wednesday.

## 2023-05-22 NOTE — TELEPHONE ENCOUNTER
Please see BK note.  Can you please let patient know okay to try GoLo,   and inform her about INR frequency.  Thank you.      SHERYL Thornton

## 2023-05-22 NOTE — TELEPHONE ENCOUNTER
Please see prior.  Please advise regarding GoLo diet plan.      Hillary:  Please let anticoagulation team know how often INR if okayed to try this diet.  Thank you.

## 2023-05-23 NOTE — TELEPHONE ENCOUNTER
I spoke to Kita yesterday.  She has not started the diet yet and told me she would let me know when she does.  I told her we would need to increase the frequency of her INR's when she first starts.  She is due to get her INR drawn on Wednesday and I'll call her Thursday with the results.

## 2023-05-25 ENCOUNTER — ANTICOAGULATION VISIT (OUTPATIENT)
Dept: CARDIOLOGY | Facility: CLINIC | Age: 78
End: 2023-05-25
Payer: COMMERCIAL

## 2023-05-25 LAB
INR PPP: 1.8 (ref 0.9–1.2)
PROTHROMBIN TIME: 18.5 SEC (ref 9.1–12)

## 2023-05-25 NOTE — PROGRESS NOTES
I called Kita - went to .  I LM for her to take 5 mg of warfarin tomorrow ( instead of 2.5 mg).  She should then go back to her regular schedule

## 2023-06-22 ENCOUNTER — ANTICOAGULATION VISIT (OUTPATIENT)
Dept: CARDIOLOGY | Facility: CLINIC | Age: 78
End: 2023-06-22
Payer: COMMERCIAL

## 2023-06-22 LAB
INR PPP: 2 (ref 0.9–1.2)
PROTHROMBIN TIME: 19.6 SEC (ref 9.1–12)

## 2023-07-19 RX ORDER — WARFARIN SODIUM 5 MG/1
TABLET ORAL
Qty: 90 TABLET | Refills: 1 | Status: SHIPPED | OUTPATIENT
Start: 2023-07-19 | End: 2024-01-17

## 2023-07-20 ENCOUNTER — ANTICOAGULATION VISIT (OUTPATIENT)
Dept: CARDIOLOGY | Facility: CLINIC | Age: 78
End: 2023-07-20
Payer: COMMERCIAL

## 2023-07-20 LAB
INR PPP: 1.8 (ref 0.9–1.2)
PROTHROMBIN TIME: 18.2 SEC (ref 9.1–12)

## 2023-07-20 NOTE — PROGRESS NOTES
I spoke to Kita - she has changed her eating habits and has been eating more vegetables.  She has lost 12 lbs.  I asked to to increase her warfarin to 5 mg everyday except Monday 2.5 mg.

## 2023-08-17 ENCOUNTER — ANTICOAGULATION VISIT (OUTPATIENT)
Dept: CARDIOLOGY | Facility: CLINIC | Age: 78
End: 2023-08-17
Payer: COMMERCIAL

## 2023-08-17 LAB
INR PPP: 2.2 (ref 0.9–1.2)
PROTHROMBIN TIME: 21.9 SEC (ref 9.1–12)

## 2023-08-17 NOTE — PROGRESS NOTES
I called Kita and GIULIA that her INR was therapeutic and to continue her current dose of warfarin.

## 2023-09-14 ENCOUNTER — ANTICOAGULATION VISIT (OUTPATIENT)
Dept: CARDIOLOGY | Facility: CLINIC | Age: 78
End: 2023-09-14
Payer: COMMERCIAL

## 2023-09-14 LAB
INR PPP: 2.5 (ref 0.9–1.2)
PROTHROMBIN TIME: 24.5 SEC (ref 9.1–12)

## 2023-10-12 ENCOUNTER — ANTICOAGULATION VISIT (OUTPATIENT)
Dept: CARDIOLOGY | Facility: CLINIC | Age: 78
End: 2023-10-12
Payer: COMMERCIAL

## 2023-10-12 LAB
INR PPP: 2.3 (ref 0.9–1.2)
PROTHROMBIN TIME: 23 SEC (ref 9.1–12)

## 2023-10-12 NOTE — PROGRESS NOTES
I called Kita to let her know her INR was therapeutic at 2.3.  She will continue her current dose of warfarin.

## 2023-11-28 ENCOUNTER — TELEPHONE (OUTPATIENT)
Dept: SCHEDULING | Facility: CLINIC | Age: 78
End: 2023-11-28
Payer: COMMERCIAL

## 2023-11-28 DIAGNOSIS — I48.11 LONGSTANDING PERSISTENT ATRIAL FIBRILLATION (CMS/HCC): Primary | ICD-10-CM

## 2023-11-28 NOTE — TELEPHONE ENCOUNTER
Patient requesting call back from Hillary.  She said she missed her call.    Patient can be reached at [phone]

## 2023-11-28 NOTE — TELEPHONE ENCOUNTER
Previous pt of Dr Santiago    Pt is requesting a return call from Hillary on how best to proceed    Pt is overdue for an INR    Last home draw was 10/2/23    Pt reports to contacting the phlebotomist this date and was advised that a new order is needed    Please reach pt at [phone]

## 2023-12-06 ENCOUNTER — ANTICOAGULATION VISIT (OUTPATIENT)
Dept: CARDIOLOGY | Facility: CLINIC | Age: 78
End: 2023-12-06
Payer: COMMERCIAL

## 2023-12-06 LAB
INR PPP: 2.3 (ref 0.9–1.2)
PROTHROMBIN TIME: 23.4 SEC (ref 9.1–12)

## 2024-01-03 ENCOUNTER — ANTICOAGULATION VISIT (OUTPATIENT)
Dept: CARDIOLOGY | Facility: CLINIC | Age: 79
End: 2024-01-03
Payer: COMMERCIAL

## 2024-01-03 LAB
INR PPP: 2.5 (ref 0.9–1.2)
PROTHROMBIN TIME: 24.5 SEC (ref 9.1–12)

## 2024-01-17 RX ORDER — WARFARIN SODIUM 5 MG/1
TABLET ORAL
Qty: 90 TABLET | Refills: 3 | Status: SHIPPED | OUTPATIENT
Start: 2024-01-17 | End: 2025-01-13

## 2024-02-01 ENCOUNTER — ANTICOAGULATION VISIT (OUTPATIENT)
Dept: CARDIOLOGY | Facility: CLINIC | Age: 79
End: 2024-02-01
Payer: COMMERCIAL

## 2024-02-01 LAB
INR PPP: 1.7 (ref 0.9–1.2)
PROTHROMBIN TIME: 17.7 SEC (ref 9.1–12)

## 2024-02-01 NOTE — PROGRESS NOTES
Tried calling Kita twice with her INR results.  Phone rings once and cuts off.  Will try again.  I finally spoke to Kita - her INR is generally therapeutic.  She denies missing any doses.  She will take 7.5 mg tonight and then return to her regular schedule.

## 2024-02-28 ENCOUNTER — ANTICOAGULATION VISIT (OUTPATIENT)
Dept: CARDIOLOGY | Facility: CLINIC | Age: 79
End: 2024-02-28
Payer: COMMERCIAL

## 2024-02-28 LAB
INR PPP: 1.9 (ref 0.9–1.2)
PROTHROMBIN TIME: 19.4 SEC (ref 9.1–12)

## 2024-02-28 NOTE — PROGRESS NOTES
Kita's INR is 1.9   There has been no change in her diet or meds.  She will continue her current dose of warfarin.

## 2024-03-01 ENCOUNTER — TELEPHONE (OUTPATIENT)
Dept: SCHEDULING | Facility: CLINIC | Age: 79
End: 2024-03-01
Payer: COMMERCIAL

## 2024-03-01 NOTE — TELEPHONE ENCOUNTER
Patient called stating  had surgery and cannot lift her out of wheelchair.    Pt states he has to wait 4 weeks.     Patient would like a 9am appointment or earlier in April at [facility] office    Please dane her at [phone] with appnt.

## 2024-03-27 ENCOUNTER — ANTICOAGULATION VISIT (OUTPATIENT)
Dept: CARDIOLOGY | Facility: CLINIC | Age: 79
End: 2024-03-27
Payer: COMMERCIAL

## 2024-03-27 LAB
INR PPP: 2.2 (ref 0.9–1.2)
PROTHROMBIN TIME: 22.4 SEC (ref 9.1–12)

## 2024-03-27 NOTE — PROGRESS NOTES
Called and spoke with patient and made her aware that we received her INR results that were drawn yesterday, which her INR was 2.2, and patient's goal is 2.0 - 3.0    Patient denies any abnormal bruising or bleeding, changes in medication or diet.  Told patient to continue with 2.5 mg of Coumadin on Monday, and 5 mg all other days.  Told patient to check her INR in 4 weeks on 04/23/2024 (patient stated she gets lab draws at home).  Patient verbally understands all above information and has no further questions at this time.

## 2024-04-04 ENCOUNTER — OFFICE VISIT (OUTPATIENT)
Dept: CARDIOLOGY | Facility: CLINIC | Age: 79
End: 2024-04-04
Payer: COMMERCIAL

## 2024-04-04 VITALS
DIASTOLIC BLOOD PRESSURE: 65 MMHG | SYSTOLIC BLOOD PRESSURE: 118 MMHG | BODY MASS INDEX: 39.4 KG/M2 | WEIGHT: 260 LBS | HEIGHT: 68 IN | HEART RATE: 70 BPM | RESPIRATION RATE: 18 BRPM

## 2024-04-04 DIAGNOSIS — R94.31 ABNORMAL EKG: Primary | ICD-10-CM

## 2024-04-04 DIAGNOSIS — Z01.810 PRE-OPERATIVE CARDIOVASCULAR EXAMINATION: ICD-10-CM

## 2024-04-04 DIAGNOSIS — E78.5 HYPERLIPIDEMIA, UNSPECIFIED HYPERLIPIDEMIA TYPE: ICD-10-CM

## 2024-04-04 LAB
QRS DURATION: 82
QT INTERVAL: 384
QTC CALCULATION(BAZETT): 402
R AXIS: 9
T WAVE AXIS: 10
VENTRICULAR RATE: 66

## 2024-04-04 PROCEDURE — 93000 ELECTROCARDIOGRAM COMPLETE: CPT | Performed by: STUDENT IN AN ORGANIZED HEALTH CARE EDUCATION/TRAINING PROGRAM

## 2024-04-04 PROCEDURE — 99215 OFFICE O/P EST HI 40 MIN: CPT | Performed by: STUDENT IN AN ORGANIZED HEALTH CARE EDUCATION/TRAINING PROGRAM

## 2024-04-04 PROCEDURE — 3008F BODY MASS INDEX DOCD: CPT | Performed by: STUDENT IN AN ORGANIZED HEALTH CARE EDUCATION/TRAINING PROGRAM

## 2024-04-04 NOTE — PROGRESS NOTES
Aryan Guevara MD  Cardiology    [facility]    [address] [place] [address], [place] [address]     TEL  [phone]  [facility] [place]     04/04/24     It was my pleasure to see Kita Vo today.     Kita Vo is a 79 y.o. female with a history of chronic atrial fibrillation on warfarin therapy, hypertension, lymphedema, gastric bypass, who presents for follow up.     She walks in the house with the walker - only very short distances and uses a chair that pushes her up. She is typically in a wheelchair when she is out of the house. Her arthritis has been severe recently.  She is severely limited by her arthritis and her significant lymphedema.  Has had 12 prior operations - orthopedic. No SOB, CP. No palpitations. No lightheadedness.     Lymphedema stable. Minimal wounds that are healing.    ECG from today personally reviewed and discussed with the patient shows atrial fibrillation.     Assessment/Plan     Chronic atrial fibrillation  She is systemically anticoagulated with warfarin.  She is consistently therapeutic and her INR is typically in the low 2s. She prefers to use Warfarin.  We discussed usage of DOAC therapy and the requirement of no additional lab work, however, she has been stable on her dosing of warfarin and prefers to do once monthly INR checks as she is tolerating this medications for a long time.  She is rate controlled and no additional AV paulette blockade is required at this time.    Hypertension  She is currently on hydrochlorothiazide 25 mg daily.  Her blood pressure is normal in the office today.  - Continue HCTZ 25 mg daily.    ASCVD risk assessment  I have no prior lipid indices for review.  She is not currently on statin therapy.  No cross-sectional imaging.  - Check lipid panel with her next INR check.    Lymphedema  She has severe lower extremity lymphedema.  This is a chronic issue for her.  She states that she has no current active wounds but  there are healing wounds on her anterior lower legs.  She does seek treatment and has a home therapeutic machine.  She should continue leg elevation and if able compression.      It was my pleasure to visit with Kita Vo in clinic today.  Please do not hesitate to contact me with any questions.      Sincerely,    ________________  Aryan Guevara MD        Cardiovascular History:    Past Medical History:  Past Medical History:   Diagnosis Date    Chronic atrial fibrillation (CMS/HCC)     Lymphedema        Past Surgical History:  Past Surgical History:   Procedure Laterality Date    CATARACT EXTRACTION, BILATERAL      COSMETIC SURGERY      Facelift; liposuction knees; panniculectomy    GASTRIC BYPASS      LUMBAR WOUND DEBRIDEMENT      POSTERIOR LAMINECTOMY / DECOMPRESSION LUMBAR SPINE      REPLACEMENT TOTAL HIP LATERAL POSITION Left     REPLACEMENT TOTAL KNEE Left     TOTAL SHOULDER REPLACEMENT Right     VAGINAL PROLAPSE REPAIR         Medications:  Current Outpatient Medications   Medication Sig Dispense Refill    ascorbic acid (VITAMIN C) 500 mg tablet Take 500 mg by mouth daily.      biotin 1,000 mcg tablet,chewable Take 1 tablet by mouth daily.      calcium carbonate-vitamin D3 600 mg-10 mcg (400 unit) per tablet Take 1 tablet by mouth daily.      cyanocobalamin (VITAMIN B12) 1,000 mcg tablet take 1 tablet by oral route  every day      hydrochlorothiazide (HYDRODIURIL) 25 mg tablet Take 25 mg by mouth daily.      multivit-iron-min-folic acid (CENTRUM) 3,500-18-0.4 unit-mg-mg tablet,chewable Take 1 tablet by mouth daily.      warfarin (COUMADIN) 5 mg tablet TAKE 1 TABLET BY MOUTH ONCE DAILY. TAKE AS DIRECTED 90 tablet 3     No current facility-administered medications for this visit.       Allergies: Acetaminophen, Aspirin, Celecoxib, Nsaids (non-steroidal anti-inflammatory drug), Oxycodone, Rofecoxib, and Tramadol    Social History:  Social History     Tobacco Use    Smoking status: Never    Smokeless tobacco:  Never   Vaping Use    Vaping Use: Never used   Substance Use Topics    Alcohol use: No    Drug use: Defer       Family History:    No family history on file.      Review of Systems: A complete 14-point review of systems is negative, except as noted in the HPI.    Exam:  Objective   There were no vitals filed for this visit.  There is no height or weight on file to calculate BMI.  Wt Readings from Last 3 Encounters:   03/02/23 122 kg (270 lb)   12/12/19 122 kg (270 lb)       Physical Exam  Constitutional:       Appearance: She is well-developed. She is obese. She is ill-appearing.   HENT:      Head: Atraumatic.   Eyes:      General: No scleral icterus.  Neck:      Vascular: No JVD.      Trachea: No tracheal deviation.   Cardiovascular:      Rate and Rhythm: Normal rate and regular rhythm.      Heart sounds: No murmur heard.     No friction rub.   Pulmonary:      Effort: Pulmonary effort is normal. No respiratory distress.      Breath sounds: Normal breath sounds. No wheezing or rales.   Abdominal:      Palpations: Abdomen is soft.      Tenderness: There is no abdominal tenderness.   Musculoskeletal:      Comments: Severe bilateral lower extremity swelling.   Skin:     General: Skin is warm and dry.          Labs: Personally reviewed and discussed with the patient.  Notable for the following.   Lab Results   Component Value Date     09/29/2016    K 3.8 09/29/2016    BUN 14 09/29/2016    CREATININE 0.5 (L) 09/29/2016    WBC 8.35 09/29/2016    HGB 10.1 (L) 09/29/2016     09/29/2016       Cardiovascular Studies:

## 2024-04-24 ENCOUNTER — ANTICOAGULATION VISIT (OUTPATIENT)
Dept: CARDIOLOGY | Facility: CLINIC | Age: 79
End: 2024-04-24
Payer: COMMERCIAL

## 2024-04-24 LAB
CHOLEST SERPL-MCNC: 183 MG/DL (ref 100–199)
HDLC SERPL-MCNC: 63 MG/DL
INR PPP: 1.9 (ref 0.9–1.2)
LDLC SERPL CALC-MCNC: 104 MG/DL (ref 0–99)
PROTHROMBIN TIME: 19.4 SEC (ref 9.1–12)
TRIGL SERPL-MCNC: 85 MG/DL (ref 0–149)
VLDLC SERPL CALC-MCNC: 16 MG/DL (ref 5–40)

## 2024-04-24 NOTE — PROGRESS NOTES
I spoke to Kita - her INR is 1.9.  She is generally therapeutic and will continue her current dose.

## 2024-06-05 ENCOUNTER — ANTICOAGULATION VISIT (OUTPATIENT)
Dept: CARDIOLOGY | Facility: CLINIC | Age: 79
End: 2024-06-05
Payer: COMMERCIAL

## 2024-06-05 LAB
INR PPP: 2.9 (ref 0.9–1.2)
PROTHROMBIN TIME: 28.9 SEC (ref 9.1–12)

## 2024-07-03 ENCOUNTER — ANTICOAGULATION VISIT (OUTPATIENT)
Dept: CARDIOLOGY | Facility: CLINIC | Age: 79
End: 2024-07-03
Payer: COMMERCIAL

## 2024-07-03 LAB
INR PPP: 2.9 (ref 0.9–1.2)
PROTHROMBIN TIME: 28.4 SEC (ref 9.1–12)

## 2024-07-31 ENCOUNTER — ANTICOAGULATION VISIT (OUTPATIENT)
Dept: CARDIOLOGY | Facility: CLINIC | Age: 79
End: 2024-07-31
Payer: COMMERCIAL

## 2024-07-31 LAB
INR PPP: 2.4 (ref 0.9–1.2)
PROTHROMBIN TIME: 24.3 SEC (ref 9.1–12)

## 2024-08-28 ENCOUNTER — ANTICOAGULATION VISIT (OUTPATIENT)
Dept: CARDIOLOGY | Facility: CLINIC | Age: 79
End: 2024-08-28
Payer: COMMERCIAL

## 2024-08-28 LAB
INR PPP: 2.6 (ref 0.9–1.2)
PROTHROMBIN TIME: 26.5 SEC (ref 9.1–12)

## 2024-09-25 ENCOUNTER — ANTICOAGULATION VISIT (OUTPATIENT)
Dept: CARDIOLOGY | Facility: CLINIC | Age: 79
End: 2024-09-25
Payer: COMMERCIAL

## 2024-09-25 LAB
INR PPP: 3.3 (ref 0.9–1.2)
PROTHROMBIN TIME: 32.4 SEC (ref 9.1–12)

## 2024-09-25 NOTE — PROGRESS NOTES
I spoke to Kita - she hasn't felt well the past few days with sinus issues- using dayquil and flonase.  She will take 2.5 mg of warfarin today and then go back to her regular schedule.

## 2024-10-23 ENCOUNTER — ANTICOAGULATION VISIT (OUTPATIENT)
Dept: CARDIOLOGY | Facility: CLINIC | Age: 79
End: 2024-10-23
Payer: COMMERCIAL

## 2024-10-23 LAB
INR PPP: 3.4 (ref 0.9–1.2)
PROTHROMBIN TIME: 33.4 SEC (ref 9.1–12)

## 2024-10-23 NOTE — PROGRESS NOTES
Kita has just started on antibiotics - she will decrease to 2.5 mg M-W-Friday until she finishes her course next week.  She will tehn return to her regular schedule.

## 2024-11-14 ENCOUNTER — TELEPHONE (OUTPATIENT)
Dept: SCHEDULING | Facility: CLINIC | Age: 79
End: 2024-11-14

## 2024-11-14 DIAGNOSIS — I48.20 CHRONIC ATRIAL FIBRILLATION (CMS/HCC): Primary | ICD-10-CM

## 2024-11-14 NOTE — TELEPHONE ENCOUNTER
Pt calling requesting to speak with Hillary NP, regarding obtaining a new script to received home INR draws , please advise pt would like a call back     P:[phone]

## 2024-11-21 ENCOUNTER — ANTICOAGULATION VISIT (OUTPATIENT)
Dept: CARDIOLOGY | Facility: CLINIC | Age: 79
End: 2024-11-21
Payer: COMMERCIAL

## 2024-11-21 LAB
INR PPP: 2 (ref 0.9–1.2)
PROTHROMBIN TIME: 21.1 SEC (ref 9.1–12)

## 2024-11-21 NOTE — PROGRESS NOTES
I spoke to Kita - her INR is therapeutic.  She will continue her current dose of warfarin.   DISPLAY PLAN FREE TEXT DISPLAY PLAN FREE TEXT DISPLAY PLAN FREE TEXT DISPLAY PLAN FREE TEXT

## 2024-11-25 NOTE — TELEPHONE ENCOUNTER
Lab results are okay for surgery. Prior pt - pt would like to start Turmeric for her arthritis.    Pt would like to discuss if okay to take it and if Dr. Guevara and Hillary feel she would benefit from it.    Instructed that it can elevate INR when taken together.    Pt can be reached [phone]

## 2024-11-25 NOTE — TELEPHONE ENCOUNTER
I spoke with patient after speaking with Hillary THOMAS. Hillary had said patient could try the tumeric to assess if it helps her arthritis pain but that we should check her INR more frequently than once a month which is her current schedule. Patient does not wish to have INR checked more frequently so will pass on trying tumeric for now. She will contact the office if she changes her mind and wants to try it.

## 2025-01-02 ENCOUNTER — TELEPHONE (OUTPATIENT)
Dept: CARDIOLOGY | Facility: CLINIC | Age: 80
End: 2025-01-02
Payer: COMMERCIAL

## 2025-01-02 ENCOUNTER — ANTICOAGULATION VISIT (OUTPATIENT)
Dept: CARDIOLOGY | Facility: CLINIC | Age: 80
End: 2025-01-02
Payer: COMMERCIAL

## 2025-01-02 NOTE — TELEPHONE ENCOUNTER
Spoke with pt who stated that she is on Amoxicillin 500 mg BID for 10 days, which pt plans on starting tonight due to sinusitis.  Pt has Chronic A-Fib, and is on Coumadin.  Pt stated that when she was on Amoxicillin in the past that Hillary NP had her change her Coumadin regimen to 2.5 mg M/W/F and 5 mg all the other days.  Told pt that Hillary is not here today, and would have to discuss with Dr. Guevara.  Also offered pt to come in for an INR check, which pt refused, stating that it is hard to get out of the house and she gets lab draws at home.    Spoke with Dr. Guevara who is agreeable with following Coumadin regimen while pt is on Amoxicillin, Coumadin 2.5 mg M/W/F and 5 mg all the other days.  Pt.'s next INR draw is on 01/14/25.    Called pt back and made her aware of all the above information, which pt verbally understands and has no further questions at this time.

## 2025-01-02 NOTE — TELEPHONE ENCOUNTER
Dr Guevara patient. Seen on 4/4/24.   On Warfarin for chronic Afib.     Patient has a sinus infection. New PCP (Karolina Potts) ordered an antibiotic. She has not received it yet but thinks it is Amoxicillin (raises INR per [facility]).  Last INR 11/20, he was due back last month and never came. PTI rescheduled her for 1/14. She declines venipuncture by any other phlebotomist.   She is also taking Flonase and Guaifenesin (no interaction per MM).   Patient asking what dose of Warfarin she should take.     Patients number is [phone]

## 2025-01-13 RX ORDER — WARFARIN SODIUM 5 MG/1
TABLET ORAL
Qty: 90 TABLET | Refills: 1 | Status: SHIPPED | OUTPATIENT
Start: 2025-01-13

## 2025-01-22 ENCOUNTER — ANTICOAGULATION VISIT (OUTPATIENT)
Dept: CARDIOLOGY | Facility: CLINIC | Age: 80
End: 2025-01-22
Payer: COMMERCIAL

## 2025-01-22 LAB
INR PPP: 2.5 (ref 0.9–1.2)
PROTHROMBIN TIME: 25 SEC (ref 9.1–12)

## 2025-02-12 ENCOUNTER — ANTICOAGULATION VISIT (OUTPATIENT)
Dept: CARDIOLOGY | Facility: CLINIC | Age: 80
End: 2025-02-12
Payer: COMMERCIAL

## 2025-02-12 LAB
INR PPP: 2.5 (ref 0.9–1.2)
PROTHROMBIN TIME: 25.9 SEC (ref 9.1–12)

## 2025-03-12 ENCOUNTER — ANTICOAGULATION VISIT (OUTPATIENT)
Dept: CARDIOLOGY | Facility: CLINIC | Age: 80
End: 2025-03-12
Payer: COMMERCIAL

## 2025-03-12 LAB
INR PPP: 2.5 (ref 0.9–1.2)
PROTHROMBIN TIME: 25.3 SEC (ref 9.1–12)

## 2025-03-12 NOTE — PROGRESS NOTES
I called Kita - dequan to .  I left her a message that her INR was at 2.5 and to continue her current dose of warfarin.  She will recheck in a month.

## 2025-04-02 ENCOUNTER — TELEPHONE (OUTPATIENT)
Dept: SCHEDULING | Facility: CLINIC | Age: 80
End: 2025-04-02
Payer: COMMERCIAL

## 2025-04-02 NOTE — TELEPHONE ENCOUNTER
Patient has gotten 3 robotic messages to confirm which she did. Patient has also called to confirm twice. FYI patient is coming to her apt.

## 2025-04-03 ENCOUNTER — OFFICE VISIT (OUTPATIENT)
Dept: CARDIOLOGY | Facility: CLINIC | Age: 80
End: 2025-04-03
Payer: COMMERCIAL

## 2025-04-03 VITALS
HEART RATE: 70 BPM | RESPIRATION RATE: 16 BRPM | DIASTOLIC BLOOD PRESSURE: 78 MMHG | SYSTOLIC BLOOD PRESSURE: 122 MMHG | HEIGHT: 68 IN | BODY MASS INDEX: 39.53 KG/M2

## 2025-04-03 DIAGNOSIS — R94.31 ABNORMAL EKG: Primary | ICD-10-CM

## 2025-04-03 DIAGNOSIS — E78.5 HYPERLIPIDEMIA, UNSPECIFIED HYPERLIPIDEMIA TYPE: ICD-10-CM

## 2025-04-03 LAB
QRS DURATION: 76
QT INTERVAL: 376
QTC CALCULATION(BAZETT): 406
R AXIS: 12
T WAVE AXIS: 8
VENTRICULAR RATE: 70

## 2025-04-03 PROCEDURE — 99214 OFFICE O/P EST MOD 30 MIN: CPT | Mod: GC | Performed by: STUDENT IN AN ORGANIZED HEALTH CARE EDUCATION/TRAINING PROGRAM

## 2025-04-03 PROCEDURE — 93000 ELECTROCARDIOGRAM COMPLETE: CPT | Performed by: STUDENT IN AN ORGANIZED HEALTH CARE EDUCATION/TRAINING PROGRAM

## 2025-04-03 PROCEDURE — 3008F BODY MASS INDEX DOCD: CPT | Performed by: STUDENT IN AN ORGANIZED HEALTH CARE EDUCATION/TRAINING PROGRAM

## 2025-04-09 ENCOUNTER — ANTICOAGULATION VISIT (OUTPATIENT)
Dept: CARDIOLOGY | Facility: CLINIC | Age: 80
End: 2025-04-09
Payer: COMMERCIAL

## 2025-04-09 LAB
INR PPP: 3.2 (ref 0.9–1.2)
PROTHROMBIN TIME: 32.3 SEC (ref 9.1–12)

## 2025-04-09 NOTE — PROGRESS NOTES
I called Kita - went to .  I LM for her that her INR was very mildly elevated at 3.2.  I asked her to take 2.5 mg of warfarin today instead of 5 mg and then to go back to her regular schedule.  I asked her to call back with questions.

## 2025-04-10 ENCOUNTER — TELEPHONE (OUTPATIENT)
Dept: SCHEDULING | Facility: CLINIC | Age: 80
End: 2025-04-10
Payer: COMMERCIAL

## 2025-04-10 NOTE — TELEPHONE ENCOUNTER
Pt called and as for a call back from Hillary regarding her medication dosage     P: [phone]

## 2025-05-07 ENCOUNTER — ANTICOAGULATION VISIT (OUTPATIENT)
Dept: CARDIOLOGY | Facility: CLINIC | Age: 80
End: 2025-05-07
Payer: COMMERCIAL

## 2025-05-07 LAB
INR PPP: 3.2 (ref 0.9–1.2)
PROTHROMBIN TIME: 31.5 SEC (ref 9.1–12)

## 2025-06-05 ENCOUNTER — ANTICOAGULATION VISIT (OUTPATIENT)
Dept: CARDIOLOGY | Facility: CLINIC | Age: 80
End: 2025-06-05
Payer: COMMERCIAL

## 2025-06-05 LAB
INR PPP: 2 (ref 0.9–1.2)
PROTHROMBIN TIME: 20.5 SEC (ref 9.1–12)

## 2025-07-02 ENCOUNTER — ANTICOAGULATION VISIT (OUTPATIENT)
Dept: CARDIOLOGY | Facility: CLINIC | Age: 80
End: 2025-07-02
Payer: COMMERCIAL

## 2025-07-02 LAB
INR PPP: 1.9 (ref 0.9–1.2)
PROTHROMBIN TIME: 19.9 SEC (ref 9.1–12)

## 2025-07-02 NOTE — PROGRESS NOTES
Received notification that pt checked her INR yesterday, which was 1.9, and pt.'s goal is 2.0 to 3.0    Left a detailed voice message for pt to call back if she has any abnormal bruising/bleeding, changes in medication or diet.  If not to continue to take 2.5 mg every Monday, & Wednesday, and 5 mg all the other days.  Pt to check her INR in one month.  Told pt to call back in the interim if she has any questions or concerns.   
yes

## 2025-07-16 RX ORDER — WARFARIN SODIUM 5 MG/1
TABLET ORAL
Qty: 90 TABLET | Refills: 1 | Status: SHIPPED | OUTPATIENT
Start: 2025-07-16

## 2025-07-30 LAB
INR PPP: 3.1 (ref 0.9–1.2)
PROTHROMBIN TIME: 30.2 SEC (ref 9.1–12)

## 2025-07-31 ENCOUNTER — ANTICOAGULATION VISIT (OUTPATIENT)
Dept: CARDIOLOGY | Facility: CLINIC | Age: 80
End: 2025-07-31
Payer: COMMERCIAL

## 2025-07-31 LAB — INR PPP: 3.1

## 2025-07-31 NOTE — PROGRESS NOTES
INR 3.1. Spoke with patient. She denies abn b/b. Denies medications changes. Per protocol, patient will remain on same dose of coumadin and repeat per her schedule in 4 weeks. She will call in the inteirm with q/q.

## 2025-08-27 ENCOUNTER — ANTICOAGULATION VISIT (OUTPATIENT)
Dept: CARDIOLOGY | Facility: CLINIC | Age: 80
End: 2025-08-27
Payer: COMMERCIAL

## 2025-08-27 LAB
INR PPP: 2.5 (ref 0.9–1.2)
PROTHROMBIN TIME: 24.5 SEC (ref 9.1–12)